# Patient Record
Sex: FEMALE | Race: WHITE | NOT HISPANIC OR LATINO | URBAN - METROPOLITAN AREA
[De-identification: names, ages, dates, MRNs, and addresses within clinical notes are randomized per-mention and may not be internally consistent; named-entity substitution may affect disease eponyms.]

---

## 2017-10-13 RX ORDER — HYDROCHLOROTHIAZIDE 12.5 MG/1
CAPSULE, GELATIN COATED ORAL
Qty: 90 CAPSULE | Refills: 0 | OUTPATIENT
Start: 2017-10-13

## 2017-10-13 NOTE — TELEPHONE ENCOUNTER
Called pt to inform appointment needed. Pt states she will call back to schedule appointment. Thanks, Elie

## 2022-05-03 ENCOUNTER — APPOINTMENT (RX ONLY)
Dept: URBAN - METROPOLITAN AREA CLINIC 123 | Facility: CLINIC | Age: 72
Setting detail: DERMATOLOGY
End: 2022-05-03

## 2022-05-03 DIAGNOSIS — L81.4 OTHER MELANIN HYPERPIGMENTATION: ICD-10-CM

## 2022-05-03 DIAGNOSIS — L82.1 OTHER SEBORRHEIC KERATOSIS: ICD-10-CM

## 2022-05-03 DIAGNOSIS — D22 MELANOCYTIC NEVI: ICD-10-CM

## 2022-05-03 DIAGNOSIS — Z71.89 OTHER SPECIFIED COUNSELING: ICD-10-CM

## 2022-05-03 DIAGNOSIS — L82.0 INFLAMED SEBORRHEIC KERATOSIS: ICD-10-CM

## 2022-05-03 PROBLEM — D48.5 NEOPLASM OF UNCERTAIN BEHAVIOR OF SKIN: Status: ACTIVE | Noted: 2022-05-03

## 2022-05-03 PROCEDURE — ? SHAVE REMOVAL

## 2022-05-03 PROCEDURE — ? COUNSELING

## 2022-05-03 PROCEDURE — 99203 OFFICE O/P NEW LOW 30 MIN: CPT | Mod: 25

## 2022-05-03 PROCEDURE — 11301 SHAVE SKIN LESION 0.6-1.0 CM: CPT

## 2022-05-03 PROCEDURE — 11301 SHAVE SKIN LESION 0.6-1.0 CM: CPT | Mod: 76

## 2022-05-03 PROCEDURE — 11300 SHAVE SKIN LESION 0.5 CM/<: CPT

## 2022-05-03 ASSESSMENT — LOCATION DETAILED DESCRIPTION DERM
LOCATION DETAILED: LEFT LATERAL SHOULDER
LOCATION DETAILED: RIGHT PROXIMAL RADIAL DORSAL FOREARM
LOCATION DETAILED: LEFT LATERAL SUPERIOR CHEST
LOCATION DETAILED: LEFT ANTERIOR DISTAL THIGH
LOCATION DETAILED: MIDDLE STERNUM
LOCATION DETAILED: LEFT INFERIOR CENTRAL MALAR CHEEK
LOCATION DETAILED: LOWER STERNUM

## 2022-05-03 ASSESSMENT — LOCATION ZONE DERM
LOCATION ZONE: ARM
LOCATION ZONE: LEG
LOCATION ZONE: FACE
LOCATION ZONE: TRUNK

## 2022-05-03 ASSESSMENT — LOCATION SIMPLE DESCRIPTION DERM
LOCATION SIMPLE: LEFT THIGH
LOCATION SIMPLE: CHEST
LOCATION SIMPLE: LEFT CHEEK
LOCATION SIMPLE: LEFT SHOULDER
LOCATION SIMPLE: RIGHT FOREARM

## 2022-05-03 NOTE — PROCEDURE: SHAVE REMOVAL
Medical Necessity Information: It is in your best interest to select a reason for this procedure from the list below. All of these items fulfill various CMS LCD requirements except the new and changing color options.
Medical Necessity Clause: This procedure was medically necessary because the lesion that was treated was:
Lab: [facility]
Lab Facility: [address]
Body Location Override (Optional - Billing Will Still Be Based On Selected Body Map Location If Applicable): left superior shoulder
Detail Level: Detailed
Was A Bandage Applied: Yes
Size Of Lesion In Cm (Required): 0.7
Size Of Margin In Cm (Margins Are Not Added To Billing Dimensions): 0.2
Biopsy Method: Dermablade
Anesthesia Type: 1% lidocaine with epinephrine and a 1:10 solution of 8.4% sodium bicarbonate
Hemostasis: Drysol
Wound Care: Petrolatum
Path Notes (To The Dermatopathologist): E
Render Path Notes In Note?: No
Triangulation (Location Of Lesion In Relation To Distance From Anatomical Landmarks): Kevin
Consent was obtained from the patient. The risks and benefits to therapy were discussed in detail. Specifically, the risks of infection, scarring, bleeding, prolonged wound healing, incomplete removal, allergy to anesthesia, nerve injury and recurrence were addressed. Prior to the procedure, the treatment site was clearly identified and confirmed by the patient. All components of Universal Protocol/PAUSE Rule completed.
Post-Care Instructions: I reviewed with the patient in detail post-care instructions. Patient is to keep the biopsy site dry overnight, and then apply bacitracin twice daily until healed. Patient may apply hydrogen peroxide soaks to remove any crusting.
Notification Instructions: Patient will be notified of pathology results. However, patient instructed to call the office if not contacted within 2 weeks.
Billing Type: [employer]
Lab: [id number]
Lab Facility: [id number]
Body Location Override (Optional - Billing Will Still Be Based On Selected Body Map Location If Applicable): left distal thigh
X Size Of Lesion In Cm (Optional): 0.6
Billing Type: Third-Party Bill
Body Location Override (Optional - Billing Will Still Be Based On Selected Body Map Location If Applicable): left clavicle
Size Of Lesion In Cm (Required): 0.4

## 2022-09-19 ENCOUNTER — APPOINTMENT (RX ONLY)
Dept: URBAN - METROPOLITAN AREA CLINIC 123 | Facility: CLINIC | Age: 72
Setting detail: DERMATOLOGY
End: 2022-09-19

## 2022-09-19 DIAGNOSIS — L57.0 ACTINIC KERATOSIS: ICD-10-CM

## 2022-09-19 DIAGNOSIS — L81.4 OTHER MELANIN HYPERPIGMENTATION: ICD-10-CM

## 2022-09-19 DIAGNOSIS — D22 MELANOCYTIC NEVI: ICD-10-CM

## 2022-09-19 DIAGNOSIS — D18.0 HEMANGIOMA: ICD-10-CM

## 2022-09-19 DIAGNOSIS — Z71.89 OTHER SPECIFIED COUNSELING: ICD-10-CM

## 2022-09-19 DIAGNOSIS — L82.0 INFLAMED SEBORRHEIC KERATOSIS: ICD-10-CM

## 2022-09-19 DIAGNOSIS — L72.0 EPIDERMAL CYST: ICD-10-CM

## 2022-09-19 DIAGNOSIS — R20.2 PARESTHESIA OF SKIN: ICD-10-CM

## 2022-09-19 DIAGNOSIS — L82.1 OTHER SEBORRHEIC KERATOSIS: ICD-10-CM

## 2022-09-19 PROBLEM — D18.01 HEMANGIOMA OF SKIN AND SUBCUTANEOUS TISSUE: Status: ACTIVE | Noted: 2022-09-19

## 2022-09-19 PROBLEM — D22.72 MELANOCYTIC NEVI OF LEFT LOWER LIMB, INCLUDING HIP: Status: ACTIVE | Noted: 2022-09-19

## 2022-09-19 PROCEDURE — ? PRESCRIPTION MEDICATION MANAGEMENT

## 2022-09-19 PROCEDURE — ? TREATMENT REGIMEN

## 2022-09-19 PROCEDURE — 17000 DESTRUCT PREMALG LESION: CPT | Mod: 59

## 2022-09-19 PROCEDURE — ? COUNSELING

## 2022-09-19 PROCEDURE — 17110 DESTRUCTION B9 LES UP TO 14: CPT

## 2022-09-19 PROCEDURE — 17003 DESTRUCT PREMALG LES 2-14: CPT | Mod: 59

## 2022-09-19 PROCEDURE — 99214 OFFICE O/P EST MOD 30 MIN: CPT | Mod: 25

## 2022-09-19 PROCEDURE — ? LIQUID NITROGEN

## 2022-09-19 ASSESSMENT — LOCATION SIMPLE DESCRIPTION DERM
LOCATION SIMPLE: CHEST
LOCATION SIMPLE: ABDOMEN
LOCATION SIMPLE: LEFT THIGH
LOCATION SIMPLE: LEFT CHEEK
LOCATION SIMPLE: RIGHT POSTERIOR UPPER ARM
LOCATION SIMPLE: RIGHT UPPER BACK
LOCATION SIMPLE: LEFT INFERIOR EYELID
LOCATION SIMPLE: LEFT UPPER BACK
LOCATION SIMPLE: RIGHT CALF

## 2022-09-19 ASSESSMENT — LOCATION DETAILED DESCRIPTION DERM
LOCATION DETAILED: LEFT RIB CAGE
LOCATION DETAILED: RIGHT MID-UPPER BACK
LOCATION DETAILED: EPIGASTRIC SKIN
LOCATION DETAILED: RIGHT DISTAL POSTERIOR UPPER ARM
LOCATION DETAILED: RIGHT RIB CAGE
LOCATION DETAILED: LEFT MEDIAL SUPERIOR CHEST
LOCATION DETAILED: RIGHT DISTAL CALF
LOCATION DETAILED: LEFT MEDIAL INFERIOR EYELID
LOCATION DETAILED: PERIUMBILICAL SKIN
LOCATION DETAILED: LEFT ANTERIOR DISTAL THIGH
LOCATION DETAILED: LEFT MEDIAL MALAR CHEEK
LOCATION DETAILED: LEFT SUPERIOR UPPER BACK
LOCATION DETAILED: LEFT INFERIOR MEDIAL MALAR CHEEK

## 2022-09-19 ASSESSMENT — LOCATION ZONE DERM
LOCATION ZONE: LEG
LOCATION ZONE: EYELID
LOCATION ZONE: FACE
LOCATION ZONE: ARM
LOCATION ZONE: TRUNK

## 2022-09-19 NOTE — PROCEDURE: TREATMENT REGIMEN
Detail Level: Simple
Plan: Recommended consult with ophthalmologist to confirm.  Recommended Dr. Gavin Faulkner and Dr. Graciela Onofre
Plan: Discussed treatment options including lasers.  Give cosmetic coordinatorâs card
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
Otc Regimen: Anti itch creams

## 2022-09-19 NOTE — HPI: ITCHING
What Type Of Note Output Would You Prefer (Optional)?: Bullet Format
How Did Your Itching Occur?: sudden in onset (over a period of weeks to a few months)
How Severe Is Your Itching?: mild
On A Scale Of 0 To 10 How Would You Rate Your Itching?: 3

## 2022-09-19 NOTE — PROCEDURE: LIQUID NITROGEN
Show Applicator Variable?: Yes
Duration Of Freeze Thaw-Cycle (Seconds): 5
Post-Care Instructions: I reviewed with the patient in detail post-care instructions. Patient is to wear sunprotection, and avoid picking at any of the treated lesions. Pt may apply Vaseline to crusted or scabbing areas.
Render Note In Bullet Format When Appropriate: No
Consent: The patient's consent was obtained including but not limited to risks of crusting, scabbing, blistering, scarring, darker or lighter pigmentary change, recurrence, incomplete removal and infection.
Detail Level: Simple
Number Of Freeze-Thaw Cycles: 1 freeze-thaw cycle
Medical Necessity Information: It is in your best interest to select a reason for this procedure from the list below. All of these items fulfill various CMS LCD requirements except the new and changing color options.
Medical Necessity Clause: This procedure was medically necessary because the lesions that were treated were:
Spray Paint Text: The liquid nitrogen was applied to the skin utilizing a spray paint frosting technique.

## 2023-03-10 ENCOUNTER — RX ONLY (OUTPATIENT)
Age: 73
Setting detail: RX ONLY
End: 2023-03-10

## 2023-03-10 RX ORDER — OXYMETAZOLINE HYDROCHLORIDE OPHTHALMIC 1 MG/ML
1 SOLUTION/ DROPS OPHTHALMIC QD
Qty: 1 | Refills: 0 | COMMUNITY
Start: 2023-03-10

## 2023-04-13 ENCOUNTER — APPOINTMENT (RX ONLY)
Dept: URBAN - METROPOLITAN AREA CLINIC 126 | Facility: CLINIC | Age: 73
Setting detail: DERMATOLOGY
End: 2023-04-13

## 2023-04-13 DIAGNOSIS — Z41.9 ENCOUNTER FOR PROCEDURE FOR PURPOSES OTHER THAN REMEDYING HEALTH STATE, UNSPECIFIED: ICD-10-CM

## 2023-04-13 PROCEDURE — ? DIAGNOSIS COMMENT

## 2023-04-13 PROCEDURE — ? SCITON BBL HERO

## 2023-04-13 PROCEDURE — ? SCITON MOXI

## 2023-04-13 ASSESSMENT — LOCATION DETAILED DESCRIPTION DERM
LOCATION DETAILED: RIGHT MEDIAL MALAR CHEEK
LOCATION DETAILED: LEFT INFERIOR CENTRAL MALAR CHEEK

## 2023-04-13 ASSESSMENT — LOCATION ZONE DERM: LOCATION ZONE: FACE

## 2023-04-13 ASSESSMENT — LOCATION SIMPLE DESCRIPTION DERM
LOCATION SIMPLE: LEFT CHEEK
LOCATION SIMPLE: RIGHT CHEEK

## 2023-04-13 NOTE — PROCEDURE: SCITON BBL HERO
Add Setting 2?: yes
Spot Size: Finesse Adapter Size: 7 mm round
Pulse Width Units: milliseconds
Total Pulses (Optional): [address] [place]
Spot Size: Finesse Adapter Size: 15 x 15 mm square
Total Pulses (Optional): [address]
Pulse Width - Will Not Render If 0: [address] [address]
Fluence (J/Cm2) - Will Not Render If 0: 15
Pulse Width - Will Not Render If 0: [address] [facility]
Temp (C): 25
Filter: 560nm Filter
Fluence (J/Cm2) - Will Not Render If 0: 0
Fluence (J/Cm2) - Will Not Render If 0: 20
Filter: 515nm Filter
Treatment Number: 1
Total Pulses (Optional): 10
Price (Use Numbers Only, No Special Characters Or $): [address] [facility]
Total Pulses (Optional): [address] [facility]
Total Pulses (Optional): [address] [facility]
Spot Size: Finesse Adapter Size: 15 x 45 mm (No Finesse Adapter)
Consent: Written consent obtained. Risks reviewed including but not limited to crusting, scabbing, blistering, scarring, darker or lighter pigmentary change, and incomplete clearance.
Add Setting 6?: no
Detail Level: Zone
Procedure Note: After applying gel and applying protective eyeware, treatment was administered using the setting parameters listed above.
Fluence (J/Cm2) - Will Not Render If 0: 6
Pulse Width - Will Not Render If 0: 3
Fluence (J/Cm2) - Will Not Render If 0: 21
Anesthesia Type: 1% lidocaine with epinephrine
Post-Care Instructions: I reviewed with the patient in detail post-care instructions. Patient should avoid sun exposure before and after treatment. Patient should wear sunscreen.

## 2023-04-13 NOTE — PROCEDURE: SCITON MOXI
Consent: Written consent obtained. Risks were reviewed including but not limited to crusting, scabbing, blistering, scarring, darker or lighter pigmentary change, incomplete improvement, prolonged erythema and facial swelling, infection, and bleeding.
Number Of Passes: 6
Treatment Number: 1
Energy (J/Cm2): 15
Anesthesia Type: 1% lidocaine with epinephrine
Pre=procedure Text: After consent was obtained, the treatment areas were cleaned and treated using the parameters noted above.
Post-Care Instructions: I reviewed with the patient in detail post-care instructions. Recommended diligent sun protection and sun avoidance.
Level 3
Laser Type: Fractionated 1927nm, thulium laser
Total Accum[facility]): [facility]
External Cooling Fan Speed: 5
Detail Level: Zone

## 2023-05-12 ENCOUNTER — APPOINTMENT (RX ONLY)
Dept: URBAN - METROPOLITAN AREA CLINIC 125 | Facility: CLINIC | Age: 73
Setting detail: DERMATOLOGY
End: 2023-05-12

## 2023-05-12 DIAGNOSIS — Z41.9 ENCOUNTER FOR PROCEDURE FOR PURPOSES OTHER THAN REMEDYING HEALTH STATE, UNSPECIFIED: ICD-10-CM

## 2023-05-12 PROCEDURE — ? COSMETIC CONSULTATION: FILLERS

## 2023-05-12 PROCEDURE — ? COSMETIC CONSULTATION: BOTOX

## 2023-05-12 PROCEDURE — ? DIAGNOSIS COMMENT

## 2023-05-12 NOTE — PROCEDURE: DIAGNOSIS COMMENT
Render Risk Assessment In Note?: no
Comment: Did not want photos at time of consultation. Discussed doing filler after series of laser treatments are complete. OK to honor filler promo until end of June per Fadi Chavez.
Detail Level: Simple
Other Specify

## 2023-05-16 ENCOUNTER — APPOINTMENT (RX ONLY)
Dept: URBAN - METROPOLITAN AREA CLINIC 126 | Facility: CLINIC | Age: 73
Setting detail: DERMATOLOGY
End: 2023-05-16

## 2023-05-16 DIAGNOSIS — Z41.9 ENCOUNTER FOR PROCEDURE FOR PURPOSES OTHER THAN REMEDYING HEALTH STATE, UNSPECIFIED: ICD-10-CM

## 2023-05-16 PROCEDURE — ? SCITON MOXI

## 2023-05-16 PROCEDURE — ? DIAGNOSIS COMMENT

## 2023-05-16 PROCEDURE — ? SCITON BBL HERO

## 2023-05-16 ASSESSMENT — LOCATION DETAILED DESCRIPTION DERM: LOCATION DETAILED: LEFT CENTRAL MALAR CHEEK

## 2023-05-16 ASSESSMENT — LOCATION SIMPLE DESCRIPTION DERM: LOCATION SIMPLE: LEFT CHEEK

## 2023-05-16 ASSESSMENT — LOCATION ZONE DERM: LOCATION ZONE: FACE

## 2023-05-16 NOTE — PROCEDURE: DIAGNOSIS COMMENT
Render Risk Assessment In Note?: yes
Detail Level: Detailed
Comment: Treatment #2 of 3, treated SK Right [address]

## 2023-05-16 NOTE — PROCEDURE: SCITON BBL HERO
Spot Size: Finesse Adapter Size: 15 x 15 mm square
Fluence (J/Cm2) - Will Not Render If 0: [address] [facility]
Add Setting 3?: yes
Consent: Written consent obtained. Risks reviewed including but not limited to crusting, scabbing, blistering, scarring, darker or lighter pigmentary change, and incomplete clearance.
Pulse Width Units: milliseconds
Temp (C): [address] [facility]
Procedure Note: After applying gel and applying protective eyeware, treatment was administered using the setting parameters listed above.
Pulse Width - Will Not Render If 0: 20
Spot Size: Finesse Adapter Size: 15 x 45 mm (No Finesse Adapter)
Add Setting 6?: no
Filter: 515nm Filter
Fluence (J/Cm2) - Will Not Render If 0: 6
Post-Care Instructions: I reviewed with the patient in detail post-care instructions. Patient should avoid sun exposure before and after treatment. Patient should wear sunscreen.
Anesthesia Type: 1% lidocaine with epinephrine
Total Pulses (Optional): [place]
Pulse Width - Will Not Render If 0: 0
Pulse Width - Will Not Render If 0: 3
Spot Size: Finesse Adapter Size: 7 mm round
Fluence (J/Cm2) - Will Not Render If 0: 12
Pulse Width - Will Not Render If 0: 21
Detail Level: Zone
Total Pulses (Optional): [address]
Total Pulses (Optional): [address]
Filter: 560nm Filter
Temp (C): 25
Price (Use Numbers Only, No Special Characters Or $): Wil Roberts
Fluence (J/Cm2) - Will Not Render If 0: 15
Filter: 640nm Filter
Total Pulses (Optional): 22

## 2023-05-16 NOTE — PROCEDURE: SCITON MOXI
Consent: Written consent obtained. Risks were reviewed including but not limited to crusting, scabbing, blistering, scarring, darker or lighter pigmentary change, incomplete improvement, prolonged erythema and facial swelling, infection, and bleeding.
Treatment Number: 1
% Per Pass: 2.9
Price (Use Numbers Only, No Special Characters Or $): Wil Roberts
Level 3
Energy (J/Cm2): [address] [facility]
Number Of Passes: 7
Anesthesia Type: 1% lidocaine with epinephrine
Total Accumulated Energy (J): [address]
Pre=procedure Text: After consent was obtained, the treatment areas were cleaned and treated using the parameters noted above.
Detail Level: Detailed
Post-Care Instructions: I reviewed with the patient in detail post-care instructions. Recommended diligent sun protection and sun avoidance.
External Cooling Fan Speed: 5
Laser Type: Fractionated 1927nm, thulium laser
Total Coverage (%): 20

## 2023-05-31 ENCOUNTER — APPOINTMENT (RX ONLY)
Dept: URBAN - METROPOLITAN AREA CLINIC 126 | Facility: CLINIC | Age: 73
Setting detail: DERMATOLOGY
End: 2023-05-31

## 2023-05-31 DIAGNOSIS — Z41.9 ENCOUNTER FOR PROCEDURE FOR PURPOSES OTHER THAN REMEDYING HEALTH STATE, UNSPECIFIED: ICD-10-CM

## 2023-05-31 PROCEDURE — ? DAXXIFY

## 2023-05-31 PROCEDURE — ? INVENTORY

## 2023-05-31 NOTE — PROCEDURE: DAXXIFY
Show Additional Area 4: Yes
Show Right And Left Pupillary Line Units: No
Left Periorbital Units: 0
Post-Care Instructions: Patient instructed to not lie down for 4 hours and limit physical activity for 24 hours.
Show Inventory Tab: Show
Additional Area 3 Location: upper lip lines
Glabellar Complex Units: [address]
Additional Area 1 Location: brow lift
Detail Level: Zone
Additional Area 3 Units: 4
Additional Area 2 Location: bunnies
Consent: Written consent obtained. Risks include but not limited to lid/brow ptosis, bruising, swelling, diplopia, temporary effect, incomplete chemical denervation.

## 2023-06-19 ENCOUNTER — APPOINTMENT (RX ONLY)
Dept: URBAN - METROPOLITAN AREA CLINIC 126 | Facility: CLINIC | Age: 73
Setting detail: DERMATOLOGY
End: 2023-06-19

## 2023-06-19 DIAGNOSIS — Z41.9 ENCOUNTER FOR PROCEDURE FOR PURPOSES OTHER THAN REMEDYING HEALTH STATE, UNSPECIFIED: ICD-10-CM

## 2023-06-19 PROCEDURE — ? COSMETIC FOLLOW-UP

## 2023-06-19 PROCEDURE — ? DAXXIFY

## 2023-06-19 PROCEDURE — ? INVENTORY

## 2023-06-19 PROCEDURE — ? PHOTO-DOCUMENTATION

## 2023-06-19 NOTE — PROCEDURE: COSMETIC FOLLOW-UP
Patient Satisfaction: Very pleased
Treatment Override (Free Text): Daxxify
Side Effects Or Complications: None
Global Improvement: Very Good
Price (Use Numbers Only, No Special Characters Or $): 0
Detail Level: Zone

## 2023-06-19 NOTE — PROCEDURE: DAXXIFY
Show Right And Left Periorbital Units: No
Forehead Units: 0
Show Depressor Anguli Units: Yes
Additional Area 1 Location: brow lift
Show Inventory Tab: Show
Additional Area 3 Units: 2
Additional Area 4 Location: oral commissars
Additional Area 2 Location: bunnies
Post-Care Instructions: Patient instructed to not lie down for 4 hours and limit physical activity for 24 hours.
Detail Level: Zone
Consent: Written consent obtained. Risks include but not limited to lid/brow ptosis, bruising, swelling, diplopia, temporary effect, incomplete chemical denervation.
Additional Area 3 Location: upper lip lines
Dilution (U/0.1 Cc): 4

## 2023-06-29 ENCOUNTER — APPOINTMENT (RX ONLY)
Dept: URBAN - METROPOLITAN AREA CLINIC 126 | Facility: CLINIC | Age: 73
Setting detail: DERMATOLOGY
End: 2023-06-29

## 2023-06-29 DIAGNOSIS — Z41.9 ENCOUNTER FOR PROCEDURE FOR PURPOSES OTHER THAN REMEDYING HEALTH STATE, UNSPECIFIED: ICD-10-CM

## 2023-06-29 PROCEDURE — ? SCITON MOXI

## 2023-06-29 PROCEDURE — ? DIAGNOSIS COMMENT

## 2023-06-29 PROCEDURE — ? INVENTORY

## 2023-06-29 PROCEDURE — ? SCITON BBL HERO

## 2023-06-29 ASSESSMENT — LOCATION ZONE DERM: LOCATION ZONE: FACE

## 2023-06-29 ASSESSMENT — LOCATION SIMPLE DESCRIPTION DERM: LOCATION SIMPLE: RIGHT CHEEK

## 2023-06-29 ASSESSMENT — LOCATION DETAILED DESCRIPTION DERM: LOCATION DETAILED: RIGHT INFERIOR CENTRAL MALAR CHEEK

## 2023-06-29 NOTE — PROCEDURE: DIAGNOSIS COMMENT
Render Risk Assessment In Note?: yes
Detail Level: Detailed
Comment: Dotty treated B/L cheeks withLN2

## 2023-06-29 NOTE — PROCEDURE: SCITON BBL HERO
Fluence (J/Cm2) - Will Not Render If 0: [address] [facility]
Pulse Width Units: milliseconds
Temp (C): 20
External Cooling Fan Speed: 0
Filter: 560nm Filter
Spot Size: Finesse Adapter Size: 15 x 15 mm square
Add Setting 5?: no
Consent: Written consent obtained. Risks reviewed including but not limited to crusting, scabbing, blistering, scarring, darker or lighter pigmentary change, and incomplete clearance.
Total Pulses (Optional): [address]
Procedure Note: After applying gel and applying protective eyeware, treatment was administered using the setting parameters listed above.
Total Pulses (Optional): 25
Add Setting 2?: yes
Spot Size: Finesse Adapter Size: 7 mm round
Detail Level: Simple
Fluence (J/Cm2) - Will Not Render If 0: 15
Anesthesia Type: 1% lidocaine with epinephrine
Post-Care Instructions: I reviewed with the patient in detail post-care instructions. Patient should avoid sun exposure before and after treatment. Patient should wear sunscreen.
Fluence (J/Cm2) - Will Not Render If 0: [address] [facility]
Total Pulses (Optional): [address]
Filter: 515nm Filter

## 2023-06-29 NOTE — PROCEDURE: SCITON MOXI
% Per Pass: 3.3
Number Of Passes: 6
Energy (J/Cm2): [address] [facility]
Consent: Written consent obtained. Risks were reviewed including but not limited to crusting, scabbing, blistering, scarring, darker or lighter pigmentary change, incomplete improvement, prolonged erythema and facial swelling, infection, and bleeding.
Laser Type: Fractionated 1927nm, thulium laser
External Cooling Fan Speed: 5
Detail Level: Detailed
Total Accumulated Energy (J): [address]
Total Coverage (%): 20
Post-Care Instructions: I reviewed with the patient in detail post-care instructions. Recommended diligent sun protection and sun avoidance.
Anesthesia Type: 1% lidocaine with epinephrine
Pre=procedure Text: After consent was obtained, the treatment areas were cleaned and treated using the parameters noted above.
Treatment Number: 1
Level 3

## 2023-10-03 ENCOUNTER — TRANSCRIBE ORDERS (OUTPATIENT)
Dept: PHYSICAL THERAPY | Facility: HOSPITAL | Age: 73
End: 2023-10-03
Payer: MEDICARE

## 2023-10-03 DIAGNOSIS — I89.0 LYMPHEDEMA: Primary | ICD-10-CM

## 2023-10-06 ENCOUNTER — HOSPITAL ENCOUNTER (OUTPATIENT)
Dept: OCCUPATIONAL THERAPY | Facility: HOSPITAL | Age: 73
Setting detail: THERAPIES SERIES
Discharge: HOME | End: 2023-10-06
Payer: MEDICARE

## 2023-10-06 DIAGNOSIS — I89.0 LYMPHEDEMA, NOT ELSEWHERE CLASSIFIED: Primary | ICD-10-CM

## 2023-10-06 DIAGNOSIS — I89.0 LYMPHEDEMA OF BOTH LOWER EXTREMITIES: ICD-10-CM

## 2023-10-06 PROCEDURE — 97165 OT EVAL LOW COMPLEX 30 MIN: CPT

## 2023-10-06 NOTE — THERAPY EVALUATION
"Outpatient Occupational Therapy Lymphedema Initial Evaluation  [facility]     Patient Name: Abhinav Fields  : 1950  MRN: [id number]  Today's Date: 10/6/2023      Visit Date: 10/06/2023    Patient Active Problem List   Diagnosis    Blues    HLD (hyperlipidemia)    BP (high blood pressure)    Osteopenia    Borderline diabetes        Past Medical History:   Diagnosis Date    Acute upper respiratory infection     Depression     Encounter for routine gynecological examination with Papanicolaou smear of cervix     Former smoker     H/O bone density study     H/O echocardiogram 2012    H/O mammogram     Health care maintenance     History of EKG 2013    Hyperlipidemia     Hypertension     Osteopenia     Physical exam, routine     Postmenopausal status     Prediabetes     Squamous cell carcinoma of skin         Past Surgical History:   Procedure Laterality Date    FOOT SURGERY Left          Visit Dx:     ICD-10-CM ICD-9-CM   1. Lymphedema, not elsewhere classified  I89.0 457.1   2. Lymphedema of both lower extremities  I89.0 457.1        Patient History       Row Name 10/06/23 1000             History    Chief Complaint Swelling  -CW      Date Current Problem(s) Began --  approx 7 years ago  -CW      Brief Description of Current Complaint Pt. reports her LE edema started about 7 years ago and has gotten worse over time. Hx. HTN, chronic venous insufficiency, HLD, LE swelling. She has tried stockings, diuretics. No CHF, but recently pt. c/o mild SOB. Hx. \"burned\" vein procedure BLE's and hysterectomy.  -CW      Patient/Caregiver Goals Know what to do to help the symptoms;Decrease swelling  -CW      Occupation/sports/leisure activities tennis, pilates, walking  -CW      How has patient tried to help current problem? Stockings, meds  -CW         Fall Risk Assessment    Any falls in the past year: Unspecified  -CW         Services    Are you currently receiving Home Health services No  -CW         Daily " "Activities    Primary Language English  -CW      Are you able to read Yes  -CW      Are you able to write Yes  -CW      Teaching needs identified Management of Condition;Home Exercise Program  -CW      Patient is concerned about/has problems with Climbing Stairs  -CW      Barriers to learning None  -CW      Explanation of Functional Status Problem Pt. reports she is indep. with self care and is active. Plays tennis and walks. Some difficulty with  steps noting mild SOB. Does pilates.  -CW      Pt Participated in POC and Goals Yes  -CW         Safety    Are you being hurt, hit, or frightened by anyone at home or in your life? No  -CW      Are you being neglected by a caregiver No  -CW                User Key  (r) = Recorded By, (t) = Taken By, (c) = Cosigned By      Initials Name Provider Type    Latanya Juan OTR Occupational Therapist                     Lymphedema       Row Name 10/06/23 1100             Subjective Pain    Able to rate subjective pain? yes  -CW      Pre-Treatment Pain Level 0  -CW      Post-Treatment Pain Level 0  -CW         Subjective    Subjective Comments Some tenderness/aching at times B lower legs.  -CW         Lymphedema Assessment    Lymphedema Classification RLE:;LLE:;stage 1 (Spontaneously Reversible)  -CW      Infections or Cellulitis? no  -CW         LLIS - Physical Concerns    The amount of pain associated with my lymphedema is: 1  -CW      The amount of limb heaviness associated with my lymphedema is: 3  -CW      The amount of skin tightness associated with my lymphedema is: 1  -CW      The size of my swollen limb(s) seems: 2  -CW      Lymphedema affects the movement of my swollen limb(s): 0  -CW      The strength in my swollen limb(s) is: 1  -CW         LLIS - Psychosocial Concerns    Lymphedema affects my body image (i.e., \"how I think I look\"). 4  -CW      Lymphedema affects my socializing with others. 2  -CW      Lymphedema affects my intimate relations with spouse or partner " "(rate 0 if not applicable 0  -CW      Lymphedema \"gets me down\" (i.e., depression, frustration, or anger) 2  -CW      I must rely on others for help due to my lymphedema. 0  -CW      I know what to do to manage my lymphedema 0  -CW         LLIS - Functional Concerns    Lymphedema affects my ability to perform self-care activities (i.e. eating, dressing, hygiene) 0  -CW      Lymphedema affects my ability to perform routine home or work-related activities. 0  -CW      Lymphedema affects my performance of preferred leisure activities. --  answered \"maybe\"  -CW      Lymphedema affects proper fit of clothing/shoes 2  -CW      Lymphedema affects my sleep 0  -CW         General ROM    GENERAL ROM COMMENTS BLE AROM WFL's.  -CW         MMT (Manual Muscle Testing)    General MMT Comments 4+/5 BLE's  -CW         Lymphedema Edema Assessment    Ptting Edema Category By grade out of 4  -CW      Pitting Edema Moderate;+ 2/4  -CW      Stemmer Sign bilateral:;negative  -CW         Skin Changes/Observations    Location/Assessment Lower Extremity  -CW      Lower Extremity Conditions bilateral:;normal;intact  -CW         Lymphedema Sensation    Lymphedema Sensation Reports RLE:;LLE:;normal  -CW         Lymphedema Measurements    Measurement Type(s) Circumferential  -CW      Circumferential Areas Lower extremities  -CW         BLE Circumferential (cm)    Measurement Location 1 10 cm. above knee  -CW      Left 1 46 cm  -CW      Right 1 45.7 cm  -CW      Measurement Location 2 knee  -CW      Left 2 37.2 cm  -CW      Right 2 37.3 cm  -CW      Measurement Location 3 10 cm. below knee  -CW      Left 3 43.2 cm  -CW      Right 3 43 cm  -CW      Measurement Location 4 20 cm. below knee  -CW      Left 4 32.8 cm  -CW      Right 4 33.7 cm  -CW      Measurement Location 5 30 cm. below knee  -CW      Left 5 25.5 cm  -CW      Right 5 25 cm  -CW      Measurement Location 6 ankle  -CW      Left 6 27.7 cm  -CW      Right 6 26.7 cm  -CW      Measurement " Location 7 mid foot  -CW      Left 7 21.5 cm  -CW      Right 7 21.5 cm  -CW      LLE Circumferential Total 233.9 cm  -CW      RLE Circumferential Total 232.9 cm  -CW         Compression/Skin Care    Compression/Skin Care compression garment  -CW      Compression Garment Comments Pt. has compression stockings at home, but may be too tight.  -CW         Lymphedema Life Impact Scale Totals    A.  Total Q1 - Q17 (Do not include Q18) 18  -CW      B.  Total number of questions answered (Q1-Q17) 16  -CW      C. Divide A by B 1.13  -CW      D. Multiple C by 25 28.25  -CW                User Key  (r) = Recorded By, (t) = Taken By, (c) = Cosigned By      Initials Name Provider Type    Latanya Juan OTR Occupational Therapist                            Therapy Education  Education Details: Discussed lymph. tx. program and poc. Issued handouts including healthy habits for edema risk reduction.  Given: Edema management, Symptoms/condition management  Program: New  How Provided: Verbal  Provided to: Patient  Level of Understanding: Verbalized         OT Goals       Row Name 10/06/23 1100          OT Short Term Goals    STG Date to Achieve 10/20/23  -CW     STG 1 Patient to demonstrate proper awareness of “What is Lymphedema” and DO’s and Don’ts” for improved prevention, management, care of symptoms, and ease of transition to self-care of condition.  -CW     STG 1 Progress New  -CW     STG 2 Patient independent and compliant with self-wrapping techniques of compression bandages with family member or caregiver as indicated for improved self-management of condition.  -CW     STG 2 Progress New  -     STG 3 Patient demonstrate decreased net edema of  BLE's  >/5-10 cm. for decreased in edema, symptoms, decreased risk of infection, and improved skin-care/transition to self-care of condition.  -CW     STG 3 Progress New  -        Long Term Goals    LTG Date to Achieve 11/03/23  -CW     LTG 1 Patient independent and compliant  with home exercise program (as able) focused on range of motion, flexibility, to improve lymphatic flow and discomfort.  -CW     LTG 1 Progress New  -CW     LTG 2 Patient will demonstrate decreased net edema of  BLE's >/=10-20 cm. for decrease in symptoms, decreased risk of infection, and improved skin-care/transition to self-care of condition.  -CW     LTG 2 Progress New  -CW     LTG 3 Patient independent and compliant with use and care of compression (example garments, inelastic velcro compression) with assistance of a caregiver as needed to promote self-care independence.  -CW     LTG 3 Progress New  -CW     LTG 4 Improve LLIS Lower extremity Life Impact scale score by 4 points.  -CW     LTG 4 Progress New  -CW        Time Calculation    OT Goal Re-Cert Due Date 12/29/23  -CW               User Key  (r) = Recorded By, (t) = Taken By, (c) = Cosigned By      Initials Name Provider Type    CW Latanya Augustine OTR Occupational Therapist                     OT Assessment/Plan       Row Name 10/06/23 1126 10/06/23 1124       OT Assessment    Functional Limitations -- Limitations in functional capacity and performance;Performance in leisure activities  -CW    Impairments -- Edema;Impaired venous circulation;Integumentary integrity;Impaired lymphatic circulation  -CW    Assessment Comments Pt. presents 72 y.o. female with moderate increased edema BLE’s. Edema is 2+ B feet to above knees. Pt. reports  her edema started about 7 years ago and has gotten worse over time with increased LE edema towards the end of the day. No LE pain complaints at rest. Total circumference .9 cm. and .9 cm. Skin is intact with no rashes or open wounds. Pt. is able to apply her compression stockings with some difficulty. Pt. would benefit from full Complete Decongestive Therapy (CDT) short term to decrease edema, decrease risk of infection, improve skin integrity, and to learn independent self-care edema management. Discussed long  term edema management with stockings vs.velcro compression and bandages. LLIS score 28.25.  -CW --    Please refer to paper survey for additional self-reported information -- Yes  -CW    OT Rehab Potential -- Good  -CW    Patient/caregiver participated in establishment of treatment plan and goals -- Yes  -CW    Patient would benefit from skilled therapy intervention -- Yes  -CW       OT Plan    OT Frequency -- 4x/week  -CW    Predicted Duration of Therapy Intervention (OT) -- 2-4 weeks  -CW    Planned CPT's? -- OT EVAL LOW COMPLEXITY: 62373;OT SELF CARE/MGMT/TRAIN 15 MIN: 29549;OT MANUAL THERAPY EA 15 MIN: 66813  -CW    Planned Therapy Interventions (Optional Details) -- home exercise program;manual therapy techniques;patient/family education  -CW    OT Plan Comments -- Plan to schedule pt. for tx.  -CW              User Key  (r) = Recorded By, (t) = Taken By, (c) = Cosigned By      Initials Name Provider Type    Latanya Juan OTR Occupational Therapist                              Time Calculation:   OT Start Time: 0830  OT Stop Time: 0930  OT Time Calculation (min): 60 min  Total Timed Code Minutes- OT: 60 minute(s)     Therapy Charges for Today       Code Description Service Date Service Provider Modifiers Qty    35872236311  OT EVAL LOW COMPLEXITY 4 10/6/2023 Latanya Augustine OTR GO 1                      RENEA Henry  10/6/2023

## 2023-10-26 ENCOUNTER — OFFICE VISIT (OUTPATIENT)
Dept: FAMILY MEDICINE CLINIC | Facility: CLINIC | Age: 73
End: 2023-10-26
Payer: MEDICARE

## 2023-10-26 VITALS
HEIGHT: 60 IN | RESPIRATION RATE: 16 BRPM | DIASTOLIC BLOOD PRESSURE: 86 MMHG | SYSTOLIC BLOOD PRESSURE: 126 MMHG | WEIGHT: 144 LBS | TEMPERATURE: 97.3 F | HEART RATE: 70 BPM | OXYGEN SATURATION: 97 % | BODY MASS INDEX: 28.27 KG/M2

## 2023-10-26 DIAGNOSIS — I89.0 LYMPHEDEMA: ICD-10-CM

## 2023-10-26 DIAGNOSIS — I10 PRIMARY HYPERTENSION: Primary | ICD-10-CM

## 2023-10-26 DIAGNOSIS — Z79.899 LONG-TERM USE OF HIGH-RISK MEDICATION: ICD-10-CM

## 2023-10-26 DIAGNOSIS — F90.0 ATTENTION DEFICIT HYPERACTIVITY DISORDER (ADHD), PREDOMINANTLY INATTENTIVE TYPE: ICD-10-CM

## 2023-10-26 DIAGNOSIS — Z23 NEEDS FLU SHOT: ICD-10-CM

## 2023-10-26 RX ORDER — DEXTROAMPHETAMINE SACCHARATE, AMPHETAMINE ASPARTATE, DEXTROAMPHETAMINE SULFATE AND AMPHETAMINE SULFATE 3.75; 3.75; 3.75; 3.75 MG/1; MG/1; MG/1; MG/1
15 TABLET ORAL AS NEEDED
COMMUNITY
Start: 2023-07-19 | End: 2023-10-26 | Stop reason: SDUPTHER

## 2023-10-26 RX ORDER — CHOLECALCIFEROL (VITAMIN D3) 125 MCG
5 CAPSULE ORAL
COMMUNITY

## 2023-10-26 RX ORDER — CETIRIZINE HYDROCHLORIDE 10 MG/1
10 TABLET ORAL DAILY
COMMUNITY

## 2023-10-26 RX ORDER — FUROSEMIDE 40 MG/1
40 TABLET ORAL AS NEEDED
COMMUNITY

## 2023-10-26 RX ORDER — FLUOROURACIL 50 MG/G
1 CREAM TOPICAL AS NEEDED
COMMUNITY

## 2023-10-26 RX ORDER — LOSARTAN POTASSIUM 25 MG/1
25 TABLET ORAL 2 TIMES DAILY
Qty: 180 TABLET | Refills: 1 | Status: SHIPPED | OUTPATIENT
Start: 2023-10-26

## 2023-10-26 RX ORDER — LOSARTAN POTASSIUM 25 MG/1
25 TABLET ORAL 2 TIMES DAILY
COMMUNITY
Start: 2023-07-26 | End: 2023-10-26 | Stop reason: SDUPTHER

## 2023-10-26 RX ORDER — CALCIUM CARBONATE 300MG(750)
TABLET,CHEWABLE ORAL
COMMUNITY

## 2023-10-26 RX ORDER — DEXTROAMPHETAMINE SACCHARATE, AMPHETAMINE ASPARTATE, DEXTROAMPHETAMINE SULFATE AND AMPHETAMINE SULFATE 3.75; 3.75; 3.75; 3.75 MG/1; MG/1; MG/1; MG/1
15 TABLET ORAL AS NEEDED
Qty: 30 TABLET | Refills: 0 | Status: SHIPPED | OUTPATIENT
Start: 2023-10-26

## 2023-10-26 NOTE — PROGRESS NOTES
Subjective     Abhinav Fields is a 72 y.o. female.     Chief Complaint   Patient presents with    Establish Care     Discuss vaccine's     Hypertension    ADHD       History of Present Illness     To establish care, discuss the followings ;    Moved from [place]    HTN with leg swelling;  well controlled with current Rx, no S/E reported.   Doing well on Losartan 25 mg , takes 2 pill /day   Takes lasix PRN  Eats HD   Walking daily     C/o Poor focusing, problem with time management , dx with ADHD many years ago at [facility] , she takes Adderall only if she needs to pay attention , ~ one x /month     Lymphedema for many years , wears compression socks     Chronic feet pain , saw Pod recently , referred her to Lymphedema clinic   Scoliosis , doing piloti     Had Cologaurd last year was neg   Had mammo recently       Labs and documentation from previous PCP / consulting physician has been reviewed          The following portions of the patient's history were reviewed and updated as appropriate: allergies, current medications, past family history, past medical history, past social history, past surgical history, and problem list.        Review of Systems   Musculoskeletal:  Positive for arthralgias.       Vitals:    10/26/23 1100   BP: 126/86   Pulse: 70   Resp: 16   Temp: 97.3 °F (36.3 °C)   SpO2: 97%           10/26/23  1100   Weight: 65.3 kg (144 lb)         Body mass index is 28.12 kg/m².      Current Outpatient Medications   Medication Sig Dispense Refill    amphetamine-dextroamphetamine (Adderall) 15 MG tablet Take 1 tablet by mouth As Needed (, ADHD sx). 30 tablet 0    fluorouracil (EFUDEX) 5 % cream Apply 1 application  topically to the appropriate area as directed As Needed.      furosemide (LASIX) 40 MG tablet Take 1 tablet by mouth As Needed.      losartan (COZAAR) 25 MG tablet Take 1 tablet by mouth 2 (Two) Times a Day. 180 tablet 1     No current facility-administered medications for this visit.                Objective    Physical Exam  Vitals and nursing note reviewed.   Constitutional:       General: She is not in acute distress.     Appearance: She is not ill-appearing, toxic-appearing or diaphoretic.   Cardiovascular:      Rate and Rhythm: Normal rate and regular rhythm.      Heart sounds: Normal heart sounds. No murmur heard.  Pulmonary:      Effort: Pulmonary effort is normal. No respiratory distress.      Breath sounds: Normal breath sounds. No stridor. No wheezing or rhonchi.   Musculoskeletal:      Right lower leg: Edema present.      Left lower leg: Edema present.   Skin:     General: Skin is warm.      Coloration: Skin is not pale.   Neurological:      Mental Status: She is alert and oriented to person, place, and time.   Psychiatric:         Mood and Affect: Mood normal.         Behavior: Behavior normal.         Thought Content: Thought content normal.           Assessment & Plan   Diagnoses and all orders for this visit:    1. Primary hypertension (Primary)  Comments:  Stable, continue current Rx  Orders:  -     losartan (COZAAR) 25 MG tablet; Take 1 tablet by mouth 2 (Two) Times a Day.  Dispense: 180 tablet; Refill: 1    2. Needs flu shot  -     Fluzone High-Dose 65+yrs    3. Attention deficit hyperactivity disorder (ADHD), predominantly inattentive type  Comments:  stable, she is using Adderall PRN , will continue PRN  Orders:  -     amphetamine-dextroamphetamine (Adderall) 15 MG tablet; Take 1 tablet by mouth As Needed (, ADHD sx).  Dispense: 30 tablet; Refill: 0    4. Long-term use of high-risk medication  -     ToxASSURE Select 13 (MW) - Urine, Clean Catch    5. Lymphedema  Comments:  follow with LE clinic        UDS next OV as she is not able to leave urine sample   PDMP reviwed , looks approperiate   Controlled substance medication agreement forms copy in chart.   Medication sent in.    Adverse effects and risks discussed.    Patient was given instructions and counseling regarding her condition or for health  maintenance advice.   Please see specific information pulled into the AVS if appropriate.       I have fully discussed the nature of the medical condition(s) risks, complications, management, safe and proper use of medications.   Encouraged medication compliance and the importance of keeping scheduled follow up appointments with me and any other providers.    Patient instructed to follow up with our office for results on any labs/imaging ordered during this visit.    Home care discussed  All questions answered  Patient verbalizes understanding and agrees to treatment plan.     Follow up: Return in about 4 weeks (around 11/23/2023) for medicare wellness, come fasting.

## 2023-11-03 DIAGNOSIS — Z00.00 MEDICARE ANNUAL WELLNESS VISIT, SUBSEQUENT: ICD-10-CM

## 2023-11-03 DIAGNOSIS — I10 PRIMARY HYPERTENSION: ICD-10-CM

## 2023-11-03 DIAGNOSIS — Z12.5 SCREENING PSA (PROSTATE SPECIFIC ANTIGEN): ICD-10-CM

## 2023-11-03 DIAGNOSIS — I10 PRIMARY HYPERTENSION: Primary | ICD-10-CM

## 2023-11-03 DIAGNOSIS — E78.5 DYSLIPIDEMIA: ICD-10-CM

## 2023-11-08 ENCOUNTER — HOSPITAL ENCOUNTER (OUTPATIENT)
Dept: OCCUPATIONAL THERAPY | Facility: HOSPITAL | Age: 73
Setting detail: THERAPIES SERIES
Discharge: HOME | End: 2023-11-08
Payer: MEDICARE

## 2023-11-08 DIAGNOSIS — I89.0 LYMPHEDEMA, NOT ELSEWHERE CLASSIFIED: Primary | ICD-10-CM

## 2023-11-08 DIAGNOSIS — I89.0 LYMPHEDEMA OF BOTH LOWER EXTREMITIES: ICD-10-CM

## 2023-11-08 PROCEDURE — 97140 MANUAL THERAPY 1/> REGIONS: CPT

## 2023-11-08 NOTE — THERAPY TREATMENT NOTE
Outpatient Occupational Therapy Lymphedema Treatment Note  [facility]     Patient Name: Abhinav Fields  : 1950  MRN: [id number]  Today's Date: 2023      Visit Date: 2023    Patient Active Problem List   Diagnosis    Blues    HLD (hyperlipidemia)    BP (high blood pressure)    Osteopenia    Borderline diabetes        Past Medical History:   Diagnosis Date    Acute upper respiratory infection     Depression     Encounter for routine gynecological examination with Papanicolaou smear of cervix     Former smoker     H/O bone density study     H/O echocardiogram 2012    H/O mammogram     Health care maintenance     History of EKG 2013    Hyperlipidemia     Hypertension     Osteopenia     Physical exam, routine     Postmenopausal status     Prediabetes     Squamous cell carcinoma of skin         Past Surgical History:   Procedure Laterality Date    FOOT SURGERY Left          Visit Dx:      ICD-10-CM ICD-9-CM   1. Lymphedema, not elsewhere classified  I89.0 457.1   2. Lymphedema of both lower extremities  I89.0 457.1        Lymphedema       Row Name 23 0800             Subjective Pain    Able to rate subjective pain? yes  -CW      Pre-Treatment Pain Level 1  -CW      Post-Treatment Pain Level 1  -CW         Subjective    Subjective Comments Pt. reports R foot pain/soreness at times especially when walking.  -CW         Skin Changes/Observations    Location/Assessment Lower Extremity  -CW      Lower Extremity Conditions bilateral:;normal;intact  -CW         Manual Lymphatic Drainage    Manual Lymphatic Drainage initial sequence;opened regional lymph nodes;opened anastamoses;extremity treatment  -CW      Initial Sequence short neck;abdomen  -CW      Abdomen superficial  -CW      Opened Regional Lymph Nodes axillary;inguinal  -CW      Axillary right;left  -CW      Inguinal right;left  -CW      Opened Anastamoses inguino-axillary  -CW      Inguino-Axillary right;left  -CW      Extremity  Treatment MLD to full limb  -CW      MLD to Full Limb BLE's  -CW         Compression/Skin Care    Compression/Skin Care skin care;wrapping location;bandaging;compression garment;remove bandages  -CW      Skin Care moisturizing lotion applied  -CW      Wrapping Location lower extremity  -CW      Wrapping Location LE bilateral:;ankle;calf  -CW      Wrapping Comments Tg9, 1- 10 cm. Ariflex, 1-8cm. Rosidal.  -CW      Bandage Layers cotton liner;padding/fluff layer;short-stretch bandages (comment size/quantity)  -CW      Bandaging Technique circumferential/spiral;moderate compression  -CW                User Key  (r) = Recorded By, (t) = Taken By, (c) = Cosigned By      Initials Name Provider Type    Latanya Juan OTR Occupational Therapist                             OT Assessment/Plan       Row Name 11/08/23 1324 11/08/23 1323       OT Assessment    Assessment Comments Pt. reports pain R foot especially when walking 1/10. Skin intact with no rashes or skin irritation. Edema increases towards the end of the day. Reviewed lymph. tx. and CDT. MLD sequence completed with pt. supine.No pain or tenderness noted during MLD. Instructed HEP and issued handouts. Pt. states she is going to do more pilates classes along with HEP.  Applied the bandages with moderate compression B ankles to knees. Reviewed how to apply the bandages sequence and technique. Ed. completed regarding bandage precautions and wearing schedule. Pt. is going out of town and is not able to attend tx. consistently next week. Discussed options for long term edema management including compression stockings and velcro compression. Pt. is also interested to possibly obtain a compression pump.  -CW --       OT Plan    OT Plan Comments -- Plan to cont. lymph. tx.  -CW              User Key  (r) = Recorded By, (t) = Taken By, (c) = Cosigned By      Initials Name Provider Type    Latanya Juan OTR Occupational Therapist                        Manual Rx (last  36 hours)       Manual Treatments       Row Name 11/08/23 1332             Total Minutes    90293 - OT Manual Therapy Minutes 58  -CW                User Key  (r) = Recorded By, (t) = Taken By, (c) = Cosigned By      Initials Name Provider Type    Latanya Juan OTR Occupational Therapist                      Therapy Education  Given: Symptoms/condition management, Bandaging/dressing change, Edema management, HEP  Program: New  How Provided: Verbal, Demonstration, Written  Provided to: Patient  Level of Understanding: Verbalized                Time Calculation:   OT Start Time: 0834  OT Stop Time: 0932  OT Time Calculation (min): 58 min  Total Timed Code Minutes- OT: 58 minute(s)  Timed Charges  58718 - OT Manual Therapy Minutes: 58  Total Minutes  Timed Charges Total Minutes: 58   Total Minutes: 58     Therapy Charges for Today       Code Description Service Date Service Provider Modifiers Qty    76020938602 HC OT MANUAL THERAPY EA 15 MIN 11/8/2023 Latanya Augustine OTR GO 4                        RENEA Henry  11/8/2023

## 2023-11-29 LAB
ALBUMIN SERPL-MCNC: 4.3 G/DL (ref 3.5–5.2)
ALBUMIN/GLOB SERPL: 2.5 G/DL
ALP SERPL-CCNC: 56 U/L (ref 39–117)
ALT SERPL-CCNC: 15 U/L (ref 1–33)
AST SERPL-CCNC: 16 U/L (ref 1–32)
BILIRUB SERPL-MCNC: 0.4 MG/DL (ref 0–1.2)
BUN SERPL-MCNC: 20 MG/DL (ref 8–23)
BUN/CREAT SERPL: 27 (ref 7–25)
CALCIUM SERPL-MCNC: 9 MG/DL (ref 8.6–10.5)
CHLORIDE SERPL-SCNC: 104 MMOL/L (ref 98–107)
CHOLEST SERPL-MCNC: 236 MG/DL (ref 0–200)
CO2 SERPL-SCNC: 27.2 MMOL/L (ref 22–29)
CREAT SERPL-MCNC: 0.74 MG/DL (ref 0.57–1)
EGFRCR SERPLBLD CKD-EPI 2021: 85.6 ML/MIN/1.73
ERYTHROCYTE [DISTWIDTH] IN BLOOD BY AUTOMATED COUNT: 13.2 % (ref 12.3–15.4)
GLOBULIN SER CALC-MCNC: 1.7 GM/DL
GLUCOSE SERPL-MCNC: 87 MG/DL (ref 65–99)
HCT VFR BLD AUTO: 42.3 % (ref 34–46.6)
HDLC SERPL-MCNC: 67 MG/DL (ref 40–60)
HGB BLD-MCNC: 13.7 G/DL (ref 12–15.9)
LDLC SERPL CALC-MCNC: 156 MG/DL (ref 0–100)
MCH RBC QN AUTO: 28.6 PG (ref 26.6–33)
MCHC RBC AUTO-ENTMCNC: 32.4 G/DL (ref 31.5–35.7)
MCV RBC AUTO: 88.3 FL (ref 79–97)
PLATELET # BLD AUTO: 293 10*3/MM3 (ref 140–450)
POTASSIUM SERPL-SCNC: 4.7 MMOL/L (ref 3.5–5.2)
PROT SERPL-MCNC: 6 G/DL (ref 6–8.5)
PSA SERPL-MCNC: <0.014 NG/ML (ref 0–4)
RBC # BLD AUTO: 4.79 10*6/MM3 (ref 3.77–5.28)
SODIUM SERPL-SCNC: 139 MMOL/L (ref 136–145)
TRIGL SERPL-MCNC: 78 MG/DL (ref 0–150)
VLDLC SERPL CALC-MCNC: 13 MG/DL (ref 5–40)
WBC # BLD AUTO: 4.85 10*3/MM3 (ref 3.4–10.8)

## 2023-12-01 ENCOUNTER — HOSPITAL ENCOUNTER (OUTPATIENT)
Dept: OCCUPATIONAL THERAPY | Facility: HOSPITAL | Age: 73
Setting detail: THERAPIES SERIES
Discharge: HOME | End: 2023-12-01
Payer: MEDICARE

## 2023-12-01 DIAGNOSIS — I89.0 LYMPHEDEMA OF BOTH LOWER EXTREMITIES: ICD-10-CM

## 2023-12-01 DIAGNOSIS — I89.0 LYMPHEDEMA, NOT ELSEWHERE CLASSIFIED: Primary | ICD-10-CM

## 2023-12-01 PROCEDURE — 97140 MANUAL THERAPY 1/> REGIONS: CPT

## 2023-12-01 NOTE — THERAPY PROGRESS REPORT/RE-CERT
Outpatient Occupational Therapy Lymphedema Progress Note  [facility]     Patient Name: Abhinav Fields  : 1950  MRN: [id number]  Today's Date: 2023      Visit Date: 2023    Patient Active Problem List   Diagnosis    Blues    HLD (hyperlipidemia)    BP (high blood pressure)    Osteopenia    Borderline diabetes        Past Medical History:   Diagnosis Date    Acute upper respiratory infection     Depression     Encounter for routine gynecological examination with Papanicolaou smear of cervix     Former smoker     H/O bone density study     H/O echocardiogram 2012    H/O mammogram     Health care maintenance     History of EKG 2013    Hyperlipidemia     Hypertension     Osteopenia     Physical exam, routine     Postmenopausal status     Prediabetes     Squamous cell carcinoma of skin         Past Surgical History:   Procedure Laterality Date    FOOT SURGERY Left          Visit Dx:      ICD-10-CM ICD-9-CM   1. Lymphedema, not elsewhere classified  I89.0 457.1   2. Lymphedema of both lower extremities  I89.0 457.1        Lymphedema       Row Name 23 1000             Subjective Pain    Able to rate subjective pain? yes  -CW      Pre-Treatment Pain Level 0  -CW      Post-Treatment Pain Level 0  -CW         Subjective    Subjective Comments Pt. reports no LE pain at present when at rest.  -CW         Skin Changes/Observations    Location/Assessment Lower Extremity  -CW      Lower Extremity Conditions bilateral:;normal;intact  -CW         Lymphedema Measurements    Measurement Type(s) Circumferential  -CW      Circumferential Areas Lower extremities  -CW         BLE Circumferential (cm)    Measurement Location 1 10 cm. above knee  -CW      Left 1 46 cm  -CW      Right 1 45.2 cm  -CW      Measurement Location 2 knee  -CW      Left 2 37.2 cm  -CW      Right 2 36.5 cm  -CW      Measurement Location 3 10 cm. below knee  -CW      Left 3 42.9 cm  -CW      Right 3 42.5 cm  -CW      Measurement  Location 4 20 cm. below knee  -CW      Left 4 32.7 cm  -CW      Right 4 33.5 cm  -CW      Measurement Location 5 30 cm. below knee  -CW      Left 5 25 cm  -CW      Right 5 24.5 cm  -CW      Measurement Location 6 ankle  -CW      Left 6 27 cm  -CW      Right 6 26.2 cm  -CW      Measurement Location 7 mid foot  -CW      Left 7 21 cm  -CW      Right 7 20.3 cm  -CW      LLE Circumferential Total 231.8 cm  -CW      RLE Circumferential Total 228.7 cm  -CW         Manual Lymphatic Drainage    Manual Lymphatic Drainage initial sequence;opened regional lymph nodes;opened anastamoses;extremity treatment  -CW      Initial Sequence short neck;abdomen  -CW      Abdomen superficial  -CW      Opened Regional Lymph Nodes axillary;inguinal  -CW      Axillary right;left  -CW      Inguinal right;left  -CW      Opened Anastamoses inguino-axillary  -CW      Inguino-Axillary right;left  -CW      Extremity Treatment MLD to full limb  -CW      MLD to Full Limb BLE's  -CW         Compression/Skin Care    Wrapping Comments Pt. left bandages at home.  -CW                User Key  (r) = Recorded By, (t) = Taken By, (c) = Cosigned By      Initials Name Provider Type    Latanya Juan OTR Occupational Therapist                             OT Assessment/Plan       Row Name 12/01/23 1306 12/01/23 1305       OT Assessment    Assessment Comments No pain complaints at present BLE's when at rest. Pt. has been applying the compression bandages at night and intermittently during the day. Skin dry and intact with no wounds or skin irritation. MLD sequence completed with pt. supine. No discomfort during MLD. Reviewed bandage wearing schedule and issued tubigrip size G as a liner option. Pt. presents with moderate non- pitting edema stage 2 with heaviness, and beginning fibrosis, hyperkeratosis noted B lower legs. Pt. was measured for compression stockings and velcro compression 11/29/23 with Medi rep present. Measurements taken. See flow sheet. Total  circumference .7 cm. and .8 cm. (4.2 cm. R and 2.1 cm. L net decrease). Pt. seems to be responding to compression.  Goals updated and reviewed POC with pt. Pt. prefers to wait until 1/1/24 to order her compression items. Pt has tried conservative tx. for >28 days including gradient compression bandages,  elevation BLE's, HEP ( goes to pilates classes). Even though pt. has tried these conservative tx. methods, pt.'s symptoms will most likely still persist. This may lead to advanced skin changes and other edema related symptoms including increased fibrosis, edema, or infections. Pt. may benefit from a compression pump to better manage her chronic edema.  -CW --       OT Plan    OT Plan Comments -- Plan to follow up with pt. as needed after 1/1/24.  -CW              User Key  (r) = Recorded By, (t) = Taken By, (c) = Cosigned By      Initials Name Provider Type    Latanya Juan OTR Occupational Therapist                        Manual Rx (last 36 hours)       Manual Treatments       Row Name 12/01/23 1322             Total Minutes    34370 - OT Manual Therapy Minutes 60  -CW                User Key  (r) = Recorded By, (t) = Taken By, (c) = Cosigned By      Initials Name Provider Type    Latanya Juan OTR Occupational Therapist                   OT Goals       Row Name 12/01/23 1300          OT Short Term Goals    STG Date to Achieve 10/20/23  -CW     STG 1 Patient to demonstrate proper awareness of “What is Lymphedema” and DO’s and Don’ts” for improved prevention, management, care of symptoms, and ease of transition to self-care of condition.  -CW     STG 1 Progress Progressing;Ongoing  -CW     STG 2 Patient independent and compliant with self-wrapping techniques of compression bandages with family member or caregiver as indicated for improved self-management of condition.  -CW     STG 2 Progress Met  -CW     STG 3 Patient demonstrate decreased net edema of  BLE's  >/5-10 cm. for decreased in edema,  symptoms, decreased risk of infection, and improved skin-care/transition to self-care of condition.  -CW     STG 3 Progress Ongoing;Progressing  -CW        Long Term Goals    LTG Date to Achieve 11/03/23  -CW     LTG 1 Patient independent and compliant with home exercise program (as able) focused on range of motion, flexibility, to improve lymphatic flow and discomfort.  -CW     LTG 1 Progress Ongoing;Progressing  -CW     LTG 2 Patient will demonstrate decreased net edema of  BLE's >/=10-20 cm. for decrease in symptoms, decreased risk of infection, and improved skin-care/transition to self-care of condition.  -CW     LTG 2 Progress Ongoing;Progressing  -CW     LTG 3 Patient independent and compliant with use and care of compression (example garments, inelastic velcro compression) with assistance of a caregiver as needed to promote self-care independence.  -CW     LTG 3 Progress Progressing;Ongoing  -CW     LTG 4 Improve LLIS Lower extremity Life Impact scale score by 4 points.  -CW     LTG 4 Progress Ongoing  -CW               User Key  (r) = Recorded By, (t) = Taken By, (c) = Cosigned By      Initials Name Provider Type    Latanya Juan OTR Occupational Therapist                    Therapy Education  Given: Edema management, Symptoms/condition management, Bandaging/dressing change  Program: Reinforced  How Provided: Verbal, Demonstration  Provided to: Patient  Level of Understanding: Verbalized                Time Calculation:   OT Start Time: 1017  OT Stop Time: 1117  OT Time Calculation (min): 60 min  Total Timed Code Minutes- OT: 60 minute(s)  Timed Charges  41545 - OT Manual Therapy Minutes: 60  Total Minutes  Timed Charges Total Minutes: 60   Total Minutes: 60     Therapy Charges for Today       Code Description Service Date Service Provider Modifiers Qty    [id number]  OT MANUAL THERAPY EA 15 MIN 12/1/2023 Latanya Augustine OTR GO 4                        RENEA Henry  12/1/2023

## 2023-12-03 LAB — DRUGS UR: NORMAL

## 2023-12-05 ENCOUNTER — OFFICE VISIT (OUTPATIENT)
Dept: FAMILY MEDICINE CLINIC | Facility: CLINIC | Age: 73
End: 2023-12-05
Payer: MEDICARE

## 2023-12-05 VITALS
OXYGEN SATURATION: 97 % | BODY MASS INDEX: 28.23 KG/M2 | SYSTOLIC BLOOD PRESSURE: 116 MMHG | WEIGHT: 143.8 LBS | DIASTOLIC BLOOD PRESSURE: 80 MMHG | HEIGHT: 60 IN | HEART RATE: 67 BPM | RESPIRATION RATE: 16 BRPM | TEMPERATURE: 97.1 F

## 2023-12-05 DIAGNOSIS — F90.0 ATTENTION DEFICIT HYPERACTIVITY DISORDER (ADHD), PREDOMINANTLY INATTENTIVE TYPE: ICD-10-CM

## 2023-12-05 DIAGNOSIS — E78.5 DYSLIPIDEMIA: ICD-10-CM

## 2023-12-05 DIAGNOSIS — M25.461 SWELLING OF BOTH KNEES: ICD-10-CM

## 2023-12-05 DIAGNOSIS — N32.9 LESION OF URINARY BLADDER: ICD-10-CM

## 2023-12-05 DIAGNOSIS — I89.0 LYMPHEDEMA: ICD-10-CM

## 2023-12-05 DIAGNOSIS — I10 PRIMARY HYPERTENSION: ICD-10-CM

## 2023-12-05 DIAGNOSIS — M25.462 SWELLING OF BOTH KNEES: ICD-10-CM

## 2023-12-05 DIAGNOSIS — R10.13 EPIGASTRIC DISCOMFORT: ICD-10-CM

## 2023-12-05 DIAGNOSIS — Z23 NEED FOR PROPHYLACTIC VACCINATION AGAINST STREPTOCOCCUS PNEUMONIAE (PNEUMOCOCCUS): ICD-10-CM

## 2023-12-05 DIAGNOSIS — Z00.00 MEDICARE ANNUAL WELLNESS VISIT, SUBSEQUENT: Primary | ICD-10-CM

## 2023-12-05 NOTE — PATIENT INSTRUCTIONS
Medicare Wellness  Personal Prevention Plan of Service     Date of Office Visit:    Encounter Provider:  Haeven Grigsby MD  Place of Service:  [facility]  Patient Name: Abhinav Fields  :  1950    As part of the Medicare Wellness portion of your visit today, we are providing you with this personalized preventive plan of services (PPPS). This plan is based upon recommendations of the [place] Preventive Services Task Force (USPSTF) and the Advisory Committee on Immunization Practices (ACIP).    This lists the preventive care services that should be considered, and provides dates of when you are due. Items listed as completed are up-to-date and do not require any further intervention.    Health Maintenance   Topic Date Due    TDAP/TD VACCINES (1 - Tdap) Never done    Pneumococcal Vaccine 65+ (1 - PCV) Never done    HEPATITIS C SCREENING  Never done    ANNUAL WELLNESS VISIT  Never done    COVID-19 Vaccine (4 - - season) 2024 (Originally 2023)    DXA SCAN  2024 (Originally 1950)    ZOSTER VACCINE (2 of 3) 2024 (Originally 12/10/2015)    BMI FOLLOWUP  10/26/2024    LIPID PANEL  2024    COLORECTAL CANCER SCREENING  2025    MAMMOGRAM  2025    INFLUENZA VACCINE  Completed       Orders Placed This Encounter   Procedures    Pneumococcal Conjugate Vaccine 20-Valent (PCV20)       No follow-ups on file.

## 2023-12-05 NOTE — PROGRESS NOTES
The ABCs of the Annual Wellness Visit  Subsequent Medicare Wellness Visit    Subjective    Abhinav Fields is a 73 y.o. female who presents for a Subsequent Medicare Wellness Visit.    The following portions of the patient's history were reviewed and   updated as appropriate: allergies, current medications, past family history, past medical history, past social history, past surgical history, and problem list.    Compared to one year ago, the patient feels her physical   health is the same.    Compared to one year ago, the patient feels her mental   health is the same.    Recent Hospitalizations:  She was not admitted to the hospital during the last year.       Current Medical Providers:  Patient Care Team:  Heaven Grigsby MD as PCP - General (Urgent Care)    Outpatient Medications Prior to Visit   Medication Sig Dispense Refill    amphetamine-dextroamphetamine (Adderall) 15 MG tablet Take 1 tablet by mouth As Needed (, ADHD sx). 30 tablet 0    cetirizine (zyrTEC) 10 MG tablet Take 1 tablet by mouth Daily.      fluorouracil (EFUDEX) 5 % cream Apply 1 application  topically to the appropriate area as directed As Needed.      furosemide (LASIX) 40 MG tablet Take 1 tablet by mouth As Needed.      losartan (COZAAR) 25 MG tablet Take 1 tablet by mouth 2 (Two) Times a Day. 180 tablet 1    Magnesium 400 MG tablet Take  by mouth As Needed.      melatonin 5 MG tablet tablet Take 1 tablet by mouth.       No facility-administered medications prior to visit.       No opioid medication identified on active medication list. I have reviewed chart for other potential  high risk medication/s and harmful drug interactions in the elderly.        Aspirin is not on active medication list.  Aspirin use is not indicated based on review of current medical condition/s. Risk of harm outweighs potential benefits.  .    Patient Active Problem List   Diagnosis    Blues    HLD (hyperlipidemia)    BP (high blood pressure)    Osteopenia     "Borderline diabetes     Advance Care Planning   Advance Care Planning     Advance Directive is not on file.  ACP discussion was held with the patient during this visit. Patient has an advance directive (not in EMR), copy requested.     Objective    Vitals:    23 0932   BP: 116/80   Pulse: 67   Resp: 16   Temp: 97.1 °F (36.2 °C)   SpO2: 97%   Weight: 65.2 kg (143 lb 12.8 oz)   Height: 152.4 cm (60\")   PainSc: 0-No pain     Estimated body mass index is 28.08 kg/m² as calculated from the following:    Height as of this encounter: 152.4 cm (60\").    Weight as of this encounter: 65.2 kg (143 lb 12.8 oz).           Does the patient have evidence of cognitive impairment? No    Lab Results   Component Value Date    CHLPL 236 (H) 2023    TRIG 78 2023    HDL 67 (H) 2023     (H) 2023    VLDL 13 2023        HEALTH RISK ASSESSMENT    Smoking Status:  Social History     Tobacco Use   Smoking Status Former    Packs/day: 0.50    Years: 20.00    Additional pack years: 0.00    Total pack years: 10.00    Types: Cigarettes    Quit date:     Years since quittin.9   Smokeless Tobacco Never     Alcohol Consumption:  Social History     Substance and Sexual Activity   Alcohol Use Yes    Comment: Wine     Fall Risk Screen:    YUMIKO Fall Risk Assessment was completed, and patient is at LOW risk for falls.Assessment completed on:2023    Depression Screenin/5/2023     9:41 AM   PHQ-2/PHQ-9 Depression Screening   Little Interest or Pleasure in Doing Things 0-->not at all   Feeling Down, Depressed or Hopeless 1-->several days   PHQ-9: Brief Depression Severity Measure Score 1       Health Habits and Functional and Cognitive Screenin/5/2023     9:37 AM   Functional & Cognitive Status   Do you have difficulty preparing food and eating? No   Do you have difficulty bathing yourself, getting dressed or grooming yourself? No   Do you have difficulty using the toilet? No   Do " you have difficulty moving around from place to place? No   Do you have trouble with steps or getting out of a bed or a chair? No   Current Diet Low Carb Diet   Dental Exam Up to date   Eye Exam Up to date   Exercise (times per week) 7 times per week   Current Exercises Include Walking   Do you need help using the phone?  No   Are you deaf or do you have serious difficulty hearing?  No   Do you need help to go to places out of walking distance? No   Do you need help shopping? No   Do you need help preparing meals?  No   Do you need help with housework?  No   Do you need help with laundry? No   Do you need help taking your medications? No   Do you need help managing money? No   Do you ever drive or ride in a car without wearing a seat belt? No   Have you felt unusual stress, anger or loneliness in the last month? No   Who do you live with? Alone   If you need help, do you have trouble finding someone available to you? No   Have you been bothered in the last four weeks by sexual problems? No   Do you have difficulty concentrating, remembering or making decisions? Yes       Age-appropriate Screening Schedule:  Refer to the list below for future screening recommendations based on patient's age, sex and/or medical conditions. Orders for these recommended tests are listed in the plan section. The patient has been provided with a written plan.    Health Maintenance   Topic Date Due    TDAP/TD VACCINES (1 - Tdap) Never done    HEPATITIS C SCREENING  Never done    COVID-19 Vaccine (4 - 2023-24 season) 12/02/2024 (Originally 9/1/2023)    DXA SCAN  12/02/2024 (Originally 1950)    ZOSTER VACCINE (2 of 3) 12/02/2024 (Originally 12/10/2015)    BMI FOLLOWUP  10/26/2024    LIPID PANEL  11/28/2024    ANNUAL WELLNESS VISIT  12/05/2024    COLORECTAL CANCER SCREENING  06/14/2025    MAMMOGRAM  08/08/2025    INFLUENZA VACCINE  Completed    Pneumococcal Vaccine 65+  Completed                  CMS Preventative Services Quick  "Reference  Risk Factors Identified During Encounter  Immunizations Discussed/Encouraged: Shingrix and COVID19  Dental Screening Recommended  Vision Screening Recommended  The above risks/problems have been discussed with the patient.  Pertinent information has been shared with the patient in the After Visit Summary.  An After Visit Summary and PPPS were made available to the patient.    Follow Up:   Next Medicare Wellness visit to be scheduled in 1 year.       Additional E&M Note during same encounter follows:  Patient has multiple medical problems which are significant and separately identifiable that require additional work above and beyond the Medicare Wellness Visit.      Chief Complaint  Medicare Wellness-subsequent (Patient has a living will.); Bloated (Last couple days); discuss labs; and feeling stomach lump , some times     Subjective        HPI  Abhinav Fields is also being seen today for the followings concern    She is c/o abd bloating with feeling a lump in her stomach area, not all the time. Has normal BM.  She is concerning for elevated BUN/Creat ration. Discussed labs, she has normal GFR and serum creat,.  Has Lymphedema , wearing pressure socks with some help but makes her both knees swollen. Takes lasix with some help. Following with LE clinic.  She is requesting referral to urology as she had h/o benign lesion on her bladder in 2020 ( when she was in [place]),  had cystoscopy was neg. She will provide document for that   ADHD; she take Adderall PRN , not daily              Objective   Vital Signs:  /80   Pulse 67   Temp 97.1 °F (36.2 °C)   Resp 16   Ht 152.4 cm (60\")   Wt 65.2 kg (143 lb 12.8 oz)   SpO2 97%   BMI 28.08 kg/m²     Physical Exam  Vitals and nursing note reviewed.   Constitutional:       General: She is not in acute distress.     Appearance: She is not ill-appearing or toxic-appearing.   HENT:      Mouth/Throat:      Mouth: Mucous membranes are dry.   Eyes:      Extraocular " Movements: Extraocular movements intact.      Conjunctiva/sclera: Conjunctivae normal.      Pupils: Pupils are equal, round, and reactive to light.   Cardiovascular:      Rate and Rhythm: Normal rate and regular rhythm.      Heart sounds: Normal heart sounds. No murmur heard.  Pulmonary:      Effort: Pulmonary effort is normal. No respiratory distress.      Breath sounds: Normal breath sounds. No stridor. No wheezing or rhonchi.   Abdominal:      General: Bowel sounds are normal. There is no distension.      Palpations: Abdomen is soft. There is no mass.      Tenderness: There is no abdominal tenderness. There is no guarding or rebound.      Hernia: No hernia is present.   Musculoskeletal:      Cervical back: Neck supple.      Right lower leg: Edema present.      Left lower leg: Edema present.   Skin:     General: Skin is warm.      Coloration: Skin is not pale.   Neurological:      Mental Status: She is alert and oriented to person, place, and time.   Psychiatric:         Mood and Affect: Mood normal.         Behavior: Behavior normal.         Thought Content: Thought content normal.          The following data was reviewed by: Heaven Grigsby MD on 12/05/2023:  CMP          11/28/2023    10:16   CMP   Glucose 87    BUN 20    Creatinine 0.74    Sodium 139    Potassium 4.7    Chloride 104    Calcium 9.0    Total Protein 6.0    Albumin 4.3    Globulin 1.7    Total Bilirubin 0.4    Alkaline Phosphatase 56    AST (SGOT) 16    ALT (SGPT) 15    BUN/Creatinine Ratio 27.0      CBC          11/28/2023    10:16   CBC   WBC 4.85    RBC 4.79    Hemoglobin 13.7    Hematocrit 42.3    MCV 88.3    MCH 28.6    MCHC 32.4    RDW 13.2    Platelets 293      Lipid Panel          11/28/2023    10:16   Lipid Panel   Total Cholesterol 236    Triglycerides 78    HDL Cholesterol 67    VLDL Cholesterol 13    LDL Cholesterol  156                 Assessment and Plan   Diagnoses and all orders for this visit:    1. Medicare annual wellness  visit, subsequent (Primary)  Comments:  disccussed all labs results    2. Primary hypertension  -     Comprehensive metabolic panel; Future    3. Dyslipidemia  -     Lipid panel; Future    4. Attention deficit hyperactivity disorder (ADHD), predominantly inattentive type  Comments:  stable, continue Rx prn    5. Lymphedema    6. Need for prophylactic vaccination against Streptococcus pneumoniae (pneumococcus)  -     Pneumococcal Conjugate Vaccine 20-Valent (PCV20)    7. Swelling of both knees  Comments:  LIKELY FROM WEARING BELWO KNEE PRESSURE SOCKS.   advised to get above knee PS    8. Epigastric discomfort  Comments:  exam benign, no concern for mass    9. Lesion of urinary bladder  Comments:  s/p removal   had Cystoscope , was neg   she will provide us with records           We discussed preventative care including age and patient-appropriate immunizations and cancer screening. We also discussed the importance of exercise and a healthy diet. This is their annual preventative exam.      I spent 55 minutes caring for Radha on this date of service. This time includes time spent by me in the following activities:preparing for the visit, reviewing tests, obtaining and/or reviewing a separately obtained history, performing a medically appropriate examination and/or evaluation , counseling and educating the patient/family/caregiver, ordering medications, tests, or procedures, referring and communicating with other health care professionals , documenting information in the medical record, independently interpreting results and communicating that information with the patient/family/caregiver and care coordination    I spent 20 minutes caring for patient on this date of service. This time includes time spent by me in the following activities: Reviewing tests, obtaining and/or reviewing a separately obtained history, counseling and educating the patient/family/caregiver, ordering medications, tests, or procedures, referring  and communicating with other health care professionals  and documenting information in the medical record                Follow Up   Return in about 6 months (around 6/5/2024) for follow up on current illness , labs before apointment. eleonora.  Patient was given instructions and counseling regarding her condition or for health maintenance advice. Please see specific information pulled into the AVS if appropriate.

## 2023-12-19 DIAGNOSIS — I10 PRIMARY HYPERTENSION: ICD-10-CM

## 2023-12-19 RX ORDER — LOSARTAN POTASSIUM 25 MG/1
25 TABLET ORAL 2 TIMES DAILY
Qty: 180 TABLET | Refills: 1 | Status: SHIPPED | OUTPATIENT
Start: 2023-12-19

## 2024-01-23 ENCOUNTER — OFFICE VISIT (OUTPATIENT)
Dept: INTERNAL MEDICINE | Facility: CLINIC | Age: 74
End: 2024-01-23
Payer: MEDICARE

## 2024-01-23 VITALS
HEART RATE: 73 BPM | SYSTOLIC BLOOD PRESSURE: 124 MMHG | OXYGEN SATURATION: 98 % | TEMPERATURE: 97.3 F | WEIGHT: 141 LBS | BODY MASS INDEX: 27.68 KG/M2 | HEIGHT: 60 IN | DIASTOLIC BLOOD PRESSURE: 70 MMHG

## 2024-01-23 DIAGNOSIS — E78.5 HYPERLIPIDEMIA, UNSPECIFIED HYPERLIPIDEMIA TYPE: ICD-10-CM

## 2024-01-23 DIAGNOSIS — I10 HYPERTENSION, UNSPECIFIED TYPE: ICD-10-CM

## 2024-01-23 DIAGNOSIS — F90.9 ATTENTION DEFICIT HYPERACTIVITY DISORDER (ADHD), UNSPECIFIED ADHD TYPE: ICD-10-CM

## 2024-01-23 DIAGNOSIS — M79.89 LEG SWELLING: Primary | ICD-10-CM

## 2024-01-23 DIAGNOSIS — R06.09 DYSPNEA ON EXERTION: ICD-10-CM

## 2024-01-23 PROCEDURE — 1159F MED LIST DOCD IN RCRD: CPT | Performed by: STUDENT IN AN ORGANIZED HEALTH CARE EDUCATION/TRAINING PROGRAM

## 2024-01-23 PROCEDURE — 3074F SYST BP LT 130 MM HG: CPT | Performed by: STUDENT IN AN ORGANIZED HEALTH CARE EDUCATION/TRAINING PROGRAM

## 2024-01-23 PROCEDURE — 99214 OFFICE O/P EST MOD 30 MIN: CPT | Performed by: STUDENT IN AN ORGANIZED HEALTH CARE EDUCATION/TRAINING PROGRAM

## 2024-01-23 PROCEDURE — 1160F RVW MEDS BY RX/DR IN RCRD: CPT | Performed by: STUDENT IN AN ORGANIZED HEALTH CARE EDUCATION/TRAINING PROGRAM

## 2024-01-23 PROCEDURE — 3078F DIAST BP <80 MM HG: CPT | Performed by: STUDENT IN AN ORGANIZED HEALTH CARE EDUCATION/TRAINING PROGRAM

## 2024-01-23 RX ORDER — ATORVASTATIN CALCIUM 40 MG/1
40 TABLET, FILM COATED ORAL DAILY
Qty: 90 TABLET | Refills: 2 | Status: SHIPPED | OUTPATIENT
Start: 2024-01-23

## 2024-01-23 NOTE — PROGRESS NOTES
"Chief Complaint  Establish Care, Shortness of Breath, Leg Swelling, and Hernia    Subjective        Abhinav Fields presents to [facility] PRIMARY CARE  History of Present Illness    Ms. Fields is a 74yo F who presents to clinic today as a new patient to establish care with complaints of chronic leg swelling, dyspnea on exertion, and for management of chronic conditions    She was previously following with another provider within [facility] however is transferring care here     She reports of chronic leg swelling dating back to 2016 in which she had an operation on her varicose veins at that time.  She currently is being seen by lymphedema clinic and currently does compression stockings, and pumps however reports minimal improvement. She also reports of some associated dyspnea, specifically with exertion that is new. Denies any chest pain, orthopnea, palpitations. She states she saw Rene Sharma who does craniosacral work for the dyspnea and reported improvement.     She also suspects she has a hernia and is interested in getting looked at. She is not interested in necessarily having any intervention but reports dissatisfaction with previous provider not working up further.       Review of Systems   Constitutional:  Negative for fever.   Respiratory:  Positive for shortness of breath. Negative for chest tightness.    Cardiovascular:  Positive for leg swelling. Negative for chest pain.   Gastrointestinal:  Negative for abdominal pain, nausea and vomiting.   Neurological:  Negative for dizziness and light-headedness.        Objective   Vital Signs:  /70   Pulse 73   Temp 97.3 °F (36.3 °C) (Temporal)   Ht 152.4 cm (60\")   Wt 64 kg (141 lb)   SpO2 98%   BMI 27.54 kg/m²   Estimated body mass index is 27.54 kg/m² as calculated from the following:    Height as of this encounter: 152.4 cm (60\").    Weight as of this encounter: 64 kg (141 lb).               Physical Exam  Vitals reviewed. "   Constitutional:       General: She is not in acute distress.     Appearance: Normal appearance. She is not ill-appearing.   HENT:      Head: Normocephalic and atraumatic.   Eyes:      General: No scleral icterus.     Extraocular Movements: Extraocular movements intact.      Conjunctiva/sclera: Conjunctivae normal.   Cardiovascular:      Rate and Rhythm: Normal rate and regular rhythm.      Heart sounds: Normal heart sounds. No murmur heard.  Pulmonary:      Effort: Pulmonary effort is normal. No respiratory distress.      Breath sounds: No rales.   Abdominal:      General: Abdomen is flat.      Palpations: Abdomen is soft. There is no mass.      Tenderness: There is no abdominal tenderness.      Hernia: Hernia is present in the ventral area.   Musculoskeletal:         General: No swelling or deformity. Normal range of motion.      Right lower le+ Edema present.      Left lower le+ Edema present.   Skin:     General: Skin is warm and dry.   Neurological:      General: No focal deficit present.      Mental Status: She is alert and oriented to person, place, and time.      Motor: No weakness.      Gait: Gait normal.   Psychiatric:         Mood and Affect: Mood normal.         Behavior: Behavior normal.        Result Review :  The following data was reviewed by: Luis Miguel Howard MD on 2024:  CMP          2023    10:16   CMP   Glucose 87    BUN 20    Creatinine 0.74    Sodium 139    Potassium 4.7    Chloride 104    Calcium 9.0    Total Protein 6.0    Albumin 4.3    Globulin 1.7    Total Bilirubin 0.4    Alkaline Phosphatase 56    AST (SGOT) 16    ALT (SGPT) 15    BUN/Creatinine Ratio 27.0      CBC          2023    10:16   CBC   WBC 4.85    RBC 4.79    Hemoglobin 13.7    Hematocrit 42.3    MCV 88.3    MCH 28.6    MCHC 32.4    RDW 13.2    Platelets 293      Lipid Panel          2023    10:16   Lipid Panel   Total Cholesterol 236    Triglycerides 78    HDL Cholesterol 67    VLDL Cholesterol  13    LDL Cholesterol  156                   Assessment and Plan   Diagnoses and all orders for this visit:    1. Leg swelling (Primary)  -     Adult Transthoracic Echo Complete W/ Cont if Necessary Per Protocol; Future    2. Dyspnea on exertion  -     Adult Transthoracic Echo Complete W/ Cont if Necessary Per Protocol; Future    3. Hypertension, unspecified type    4. Attention deficit hyperactivity disorder (ADHD), unspecified ADHD type    5. Hyperlipidemia, unspecified hyperlipidemia type  -     atorvastatin (LIPITOR) 40 MG tablet; Take 1 tablet by mouth Daily.  Dispense: 90 tablet; Refill: 2      Ms. Fields is a 74yo F who presents to clinic today as a new patient to establish care with complaints of chronic leg swelling, dyspnea on exertion, and for management of chronic conditions    She reports of chronic leg swelling presumed to be from lymphedema with new onset associated dyspnea on exertion. Review of previous cardiology OV, previous PCP and lymphedema notes where it was suspected from chronic venous insufficiency. Her exam shows no signs of volume overload and edema is bilateral and non-pitting however given her new onset of dyspnea will obtain Echo for further evaluation as has risk factors for ASCVD (HLD, former smoker, HTN)    Hyperlipidemia appears newly diagnosed, will initiate atorvastatin given ASCVD risk at 16.5%. Counseled on possible side effects    Her hernia is very small and appears umbilical/ventral area with reducibility. She does not want to have intervention at this time so I discussed there is low utility in obtaining imaging given exam findings. Will continue to monitor     Will follow up in two months after echo findings and plan to repeat lipid panel at that time         Follow Up   Return in about 8 weeks (around 3/19/2024).  Patient was given instructions and counseling regarding her condition or for health maintenance advice. Please see specific information pulled into the AVS if  appropriate.

## 2024-01-30 ENCOUNTER — TELEPHONE (OUTPATIENT)
Dept: CARDIOLOGY | Facility: CLINIC | Age: 74
End: 2024-01-30
Payer: MEDICARE

## 2024-01-31 ENCOUNTER — HOSPITAL ENCOUNTER (OUTPATIENT)
Dept: CARDIOLOGY | Facility: HOSPITAL | Age: 74
Discharge: HOME | End: 2024-01-31
Admitting: STUDENT IN AN ORGANIZED HEALTH CARE EDUCATION/TRAINING PROGRAM
Payer: MEDICARE

## 2024-01-31 VITALS
BODY MASS INDEX: 27.68 KG/M2 | HEART RATE: 65 BPM | SYSTOLIC BLOOD PRESSURE: 160 MMHG | WEIGHT: 141 LBS | HEIGHT: 60 IN | DIASTOLIC BLOOD PRESSURE: 94 MMHG | OXYGEN SATURATION: 96 %

## 2024-01-31 DIAGNOSIS — R06.09 DYSPNEA ON EXERTION: ICD-10-CM

## 2024-01-31 DIAGNOSIS — M79.89 LEG SWELLING: ICD-10-CM

## 2024-01-31 LAB
AORTIC ARCH: 2.4 CM
ASCENDING AORTA: 3 CM
BH CV ECHO MEAS - ACS: 1.76 CM
BH CV ECHO MEAS - AO MAX PG: 8.8 MMHG
BH CV ECHO MEAS - AO MEAN PG: 5 MMHG
BH CV ECHO MEAS - AO ROOT DIAM: 2.7 CM
BH CV ECHO MEAS - AO V2 MAX: 148 CM/SEC
BH CV ECHO MEAS - AO V2 VTI: 36.7 CM
BH CV ECHO MEAS - AVA(I,D): 2 CM2
BH CV ECHO MEAS - EDV(CUBED): 46.7 ML
BH CV ECHO MEAS - EDV(MOD-SP2): 57 ML
BH CV ECHO MEAS - EDV(MOD-SP4): 58 ML
BH CV ECHO MEAS - EF(MOD-BP): 66.6 %
BH CV ECHO MEAS - EF(MOD-SP2): 64.9 %
BH CV ECHO MEAS - EF(MOD-SP4): 67.2 %
BH CV ECHO MEAS - ESV(CUBED): 12.6 ML
BH CV ECHO MEAS - ESV(MOD-SP2): 20 ML
BH CV ECHO MEAS - ESV(MOD-SP4): 19 ML
BH CV ECHO MEAS - FS: 35.4 %
BH CV ECHO MEAS - IVS/LVPW: 1.33 CM
BH CV ECHO MEAS - IVSD: 1 CM
BH CV ECHO MEAS - LAT PEAK E' VEL: 7.9 CM/SEC
BH CV ECHO MEAS - LV DIASTOLIC VOL/BSA (35-75): 36 CM2
BH CV ECHO MEAS - LV MASS(C)D: 89.2 GRAMS
BH CV ECHO MEAS - LV MAX PG: 3.7 MMHG
BH CV ECHO MEAS - LV MEAN PG: 2 MMHG
BH CV ECHO MEAS - LV SYSTOLIC VOL/BSA (12-30): 11.8 CM2
BH CV ECHO MEAS - LV V1 MAX: 96.8 CM/SEC
BH CV ECHO MEAS - LV V1 VTI: 23 CM
BH CV ECHO MEAS - LVIDD: 3.6 CM
BH CV ECHO MEAS - LVIDS: 2.33 CM
BH CV ECHO MEAS - LVOT AREA: 3.2 CM2
BH CV ECHO MEAS - LVOT DIAM: 2.02 CM
BH CV ECHO MEAS - LVPWD: 0.75 CM
BH CV ECHO MEAS - MED PEAK E' VEL: 6.8 CM/SEC
BH CV ECHO MEAS - MR MAX PG: 58.1 MMHG
BH CV ECHO MEAS - MR MAX VEL: 381 CM/SEC
BH CV ECHO MEAS - MV A DUR: 0.13 SEC
BH CV ECHO MEAS - MV A MAX VEL: 95.1 CM/SEC
BH CV ECHO MEAS - MV DEC SLOPE: 402.1 CM/SEC2
BH CV ECHO MEAS - MV DEC TIME: 0.21 SEC
BH CV ECHO MEAS - MV E MAX VEL: 67.3 CM/SEC
BH CV ECHO MEAS - MV E/A: 0.71
BH CV ECHO MEAS - MV MAX PG: 4.7 MMHG
BH CV ECHO MEAS - MV MEAN PG: 1.69 MMHG
BH CV ECHO MEAS - MV P1/2T: 62.7 MSEC
BH CV ECHO MEAS - MV V2 VTI: 31.5 CM
BH CV ECHO MEAS - MVA(P1/2T): 3.5 CM2
BH CV ECHO MEAS - MVA(VTI): 2.33 CM2
BH CV ECHO MEAS - PA ACC TIME: 0.12 SEC
BH CV ECHO MEAS - PA V2 MAX: 110.5 CM/SEC
BH CV ECHO MEAS - PI END-D VEL: 85.3 CM/SEC
BH CV ECHO MEAS - PULM A REVS DUR: 0.11 SEC
BH CV ECHO MEAS - PULM A REVS VEL: 31.7 CM/SEC
BH CV ECHO MEAS - PULM DIAS VEL: 38.1 CM/SEC
BH CV ECHO MEAS - PULM S/D: 1.38
BH CV ECHO MEAS - PULM SYS VEL: 52.7 CM/SEC
BH CV ECHO MEAS - RAP SYSTOLE: 3 MMHG
BH CV ECHO MEAS - RV MAX PG: 2.7 MMHG
BH CV ECHO MEAS - RV V1 MAX: 81.4 CM/SEC
BH CV ECHO MEAS - RV V1 VTI: 19.1 CM
BH CV ECHO MEAS - RVSP: 23 MMHG
BH CV ECHO MEAS - SI(MOD-SP2): 23 ML/M2
BH CV ECHO MEAS - SI(MOD-SP4): 24.2 ML/M2
BH CV ECHO MEAS - SUP REN AO DIAM: 1.9 CM
BH CV ECHO MEAS - SV(LVOT): 73.5 ML
BH CV ECHO MEAS - SV(MOD-SP2): 37 ML
BH CV ECHO MEAS - SV(MOD-SP4): 39 ML
BH CV ECHO MEAS - TR MAX PG: 20.1 MMHG
BH CV ECHO MEAS - TR MAX VEL: 224 CM/SEC
BH CV ECHO MEASUREMENTS AVERAGE E/E' RATIO: 9.16
BH CV XLRA - RV BASE: 3 CM
BH CV XLRA - RV LENGTH: 6.1 CM
BH CV XLRA - RV MID: 2.6 CM
BH CV XLRA - TDI S': 15.2 CM/SEC
LEFT ATRIUM VOLUME INDEX: 25 ML/M2
SINUS: 2.37 CM
STJ: 2.16 CM

## 2024-01-31 PROCEDURE — 93306 TTE W/DOPPLER COMPLETE: CPT

## 2024-03-19 ENCOUNTER — OFFICE VISIT (OUTPATIENT)
Dept: INTERNAL MEDICINE | Facility: CLINIC | Age: 74
End: 2024-03-19
Payer: MEDICARE

## 2024-03-19 VITALS
HEART RATE: 66 BPM | DIASTOLIC BLOOD PRESSURE: 82 MMHG | WEIGHT: 147.4 LBS | TEMPERATURE: 97.1 F | HEIGHT: 60 IN | BODY MASS INDEX: 28.94 KG/M2 | OXYGEN SATURATION: 96 % | SYSTOLIC BLOOD PRESSURE: 128 MMHG

## 2024-03-19 DIAGNOSIS — M79.89 LEG SWELLING: ICD-10-CM

## 2024-03-19 DIAGNOSIS — E78.5 HYPERLIPIDEMIA, UNSPECIFIED HYPERLIPIDEMIA TYPE: Primary | ICD-10-CM

## 2024-03-19 DIAGNOSIS — E66.3 OVERWEIGHT: ICD-10-CM

## 2024-03-19 NOTE — PROGRESS NOTES
"Chief Complaint  Hyperlipidemia and Leg Swelling    Subjective        Abhinav Fields presents to [facility] PRIMARY CARE  History of Present Illness    Ms. Fields is a 72yo F who presents to clinic today for follow up of HLD and leg swelling    Since last visit she has started on statin and is tolerating well.     Her leg swelling has also improved somewhat with recent lifestyle changes. She is drinking less liquid and and is joining pilates class.She is also planning to count calories in better effort to lose weight         Review of Systems   Respiratory:  Negative for shortness of breath.    Cardiovascular:  Positive for leg swelling. Negative for chest pain.        Objective   Vital Signs:  /82   Pulse 66   Temp 97.1 °F (36.2 °C) (Temporal)   Ht 152.4 cm (60\")   Wt 66.9 kg (147 lb 6.4 oz)   SpO2 96%   BMI 28.79 kg/m²   Estimated body mass index is 28.79 kg/m² as calculated from the following:    Height as of this encounter: 152.4 cm (60\").    Weight as of this encounter: 66.9 kg (147 lb 6.4 oz).               Physical Exam  Constitutional:       General: She is not in acute distress.     Appearance: Normal appearance. She is not toxic-appearing.   HENT:      Head: Normocephalic and atraumatic.   Eyes:      General: No scleral icterus.     Conjunctiva/sclera: Conjunctivae normal.   Cardiovascular:      Rate and Rhythm: Normal rate and regular rhythm.      Heart sounds: Normal heart sounds.   Musculoskeletal:      Right lower leg: Edema present.      Left lower leg: Edema present.   Skin:     General: Skin is warm and dry.   Neurological:      Mental Status: She is alert and oriented to person, place, and time.   Psychiatric:         Mood and Affect: Mood normal.         Behavior: Behavior normal.        Result Review :                   Assessment and Plan   Diagnoses and all orders for this visit:    1. Hyperlipidemia, unspecified hyperlipidemia type (Primary)  -     Lipid Panel With " / Chol / HDL Ratio    2. Overweight    3. Leg swelling      Will repeat lipid panel today since recent initiation of statin.    Her leg swelling has improved with lifestyle changes, so recommended continuing those. Echo obtained since last visit showed no WMA and normal EF    Expresses concern regarding weight and discussed options for treatment. She is trying to exercise more and counseled on healthy dietary habits as well as limiting alcohol intake which will assist with this.     Will have her follow up in 3 months to discuss weight and I expressed to her if she is having persistent or worsening dyspnea with exertion I would consider further evaluating with stress test to rule out any ASCVD          Follow Up   Return in about 3 months (around 6/19/2024).  Patient was given instructions and counseling regarding her condition or for health maintenance advice. Please see specific information pulled into the AVS if appropriate.

## 2024-03-21 LAB
CHOLEST SERPL-MCNC: 163 MG/DL (ref 0–200)
CHOLEST/HDLC SERPL: 2.59 {RATIO}
HDLC SERPL-MCNC: 63 MG/DL (ref 40–60)
LDLC SERPL CALC-MCNC: 88 MG/DL (ref 0–100)
TRIGL SERPL-MCNC: 59 MG/DL (ref 0–150)
VLDLC SERPL CALC-MCNC: 12 MG/DL (ref 5–40)

## 2024-04-16 DIAGNOSIS — K42.9 UMBILICAL HERNIA WITHOUT OBSTRUCTION AND WITHOUT GANGRENE: Primary | ICD-10-CM

## 2024-04-26 ENCOUNTER — HOSPITAL ENCOUNTER (OUTPATIENT)
Dept: ULTRASOUND IMAGING | Facility: HOSPITAL | Age: 74
Discharge: HOME | End: 2024-04-26
Payer: MEDICARE

## 2024-04-26 DIAGNOSIS — K42.9 UMBILICAL HERNIA WITHOUT OBSTRUCTION AND WITHOUT GANGRENE: ICD-10-CM

## 2024-04-26 PROCEDURE — 76705 ECHO EXAM OF ABDOMEN: CPT

## 2024-05-10 DIAGNOSIS — K43.9 VENTRAL HERNIA WITHOUT OBSTRUCTION OR GANGRENE: Primary | ICD-10-CM

## 2024-05-28 ENCOUNTER — OFFICE VISIT (OUTPATIENT)
Dept: SURGERY | Facility: CLINIC | Age: 74
End: 2024-05-28
Payer: MEDICARE

## 2024-05-28 VITALS
DIASTOLIC BLOOD PRESSURE: 82 MMHG | BODY MASS INDEX: 28.66 KG/M2 | WEIGHT: 146 LBS | HEIGHT: 60 IN | SYSTOLIC BLOOD PRESSURE: 124 MMHG

## 2024-05-28 DIAGNOSIS — K43.9 EPIGASTRIC HERNIA: Primary | ICD-10-CM

## 2024-05-28 PROCEDURE — 3074F SYST BP LT 130 MM HG: CPT | Performed by: SURGERY

## 2024-05-28 PROCEDURE — 99203 OFFICE O/P NEW LOW 30 MIN: CPT | Performed by: SURGERY

## 2024-05-28 PROCEDURE — 1160F RVW MEDS BY RX/DR IN RCRD: CPT | Performed by: SURGERY

## 2024-05-28 PROCEDURE — 1159F MED LIST DOCD IN RCRD: CPT | Performed by: SURGERY

## 2024-05-28 PROCEDURE — 3079F DIAST BP 80-89 MM HG: CPT | Performed by: SURGERY

## 2024-05-28 RX ORDER — HYDROCHLOROTHIAZIDE 12.5 MG/1
1 CAPSULE, GELATIN COATED ORAL DAILY
COMMUNITY

## 2024-05-28 RX ORDER — ACYCLOVIR 400 MG/1
TABLET ORAL
COMMUNITY
Start: 2024-05-14

## 2024-05-28 RX ORDER — SODIUM CHLORIDE 9 MG/ML
40 INJECTION, SOLUTION INTRAVENOUS AS NEEDED
OUTPATIENT
Start: 2024-05-28

## 2024-05-28 RX ORDER — SODIUM CHLORIDE 0.9 % (FLUSH) 0.9 %
10 SYRINGE (ML) INJECTION EVERY 12 HOURS SCHEDULED
OUTPATIENT
Start: 2024-05-28

## 2024-05-28 RX ORDER — SODIUM CHLORIDE 0.9 % (FLUSH) 0.9 %
10 SYRINGE (ML) INJECTION AS NEEDED
OUTPATIENT
Start: 2024-05-28

## 2024-05-28 NOTE — PROGRESS NOTES
"CC: Abdominal wall hernia     HPI: Patient is a very pleasant 73-year-old female that is here today for evaluation of abdominal wall hernia.  The patient states she has noticed a bulge approximately 6 months ago.  She was not having any pain over the area but slight discomfort.  She noted the bulge was getting larger.  She saw her primary care physician the recommend the patient undergo ultrasound of the abdominal wall.  She was found to have a small hernia.  She is here today for evaluation.  She reports no prior history of abdominal wall surgery     PMH: Hypertension, hyperlipidemia, osteopenia     PSH:   -Nasal surgery, cosmetic surgery, colonoscopy, breast biopsy, hysterectomy    MEDS: Acyclovir, Lasix, microcyte, losartan, magnesium, melatonin,     ALL: Amlodipine and penicillin, penicillin allergy supposedly as a child    FH and SH: Family history is noncontributory to current issue.  Drinks alcohol occasionally.  No drugs or smoking     ROS:   Constitutional: denies any weight changes, fatigue or weakness.  HEENT: Denies hearing loss and rhinorrhea  Cardiovascular: denies chest pain, palpitations, edemas.  Respiratory: denies cough, sputum, SOB.  Gastrointestinal: denies N&V, abd pain, diarrhea, constipation.  Genitourinary: denies dysuria, frequency.  Endocrine: denies cold intolerance, lethargy and flushing.  Hem: denies excessive bruising and postop bleeding.  Musculoskeletal: denies weakness, joint swelling, pain or stiffness.  Neuro: denies seizures, CVA, paresthesia, or peripheral neuropathy.   Skin: denies change in nevi, rashes, masses.     PE:   Vitals:    05/28/24 1408   BP: 124/82   BP Location: Left arm   Patient Position: Sitting   Weight: 66.2 kg (146 lb)   Height: 152.4 cm (60\")     Body mass index is 28.51 kg/m².   Alert and oriented ×3, no acute distress.  Head is normocephalic and atraumatic.  Neck is supple  Breathing comfortable  Abdomen is soft and nontender, is nondistended, bowel sounds " are positive.  There is no rebound or guarding is and there is no peritoneal signs.  There is a small and is reducible epigastric hernia approximately 3 cm above the umbilicus.  No clubbing cyanosis or edema in lower or upper extremities     Diagnostic studies:   Ultrasound abdominal wall 4/26/2024:   FINDINGS: Corresponding to the palpable abnormality is a small ventral  hernia. The abdominal wall defect is approximately 9-10 mm. Mesenteric  fat projects through the defect into the subcutaneous layer, this  measuring 3.8 x 1.3 cm. No fluid or edema seen. The remainder is  unremarkable.     Assessment and plan    The patient is a very pleasant 73-year-old female with small and easily reducible epigastric hernia.  Discussed with patient about treatment options that would include open versus robotic assisted laparoscopic hernia repair.  Because of the size I recommend that she undergoes open epigastric hernia repair with mesh placement.  Risk and benefits of procedure including bleeding, infection, mesh infection, wound complications, chronic pain, hernia recurrence discussed in detail with the patient.  We discussed about the possibility of placing mesh depending on the size of the defect on the strength of the surrounding tissue.  We discussed about permanent as well as slowly reabsorbing material for mesh repair.  Discussed with her that in general we have to have minor issues with permanent mesh for this type of hernia repair.  She understood    Patient agreed to have open epigastric hernia repair with possible mesh placement.  Patient to call whenever ready for scheduling     Arturo Archibald MD  General, Minimally Invasive and Endoscopic Surgery  [facility]     [address], [address]  [place], [place], [address]  P: [phone]  F: [phone]

## 2024-06-17 ENCOUNTER — TELEPHONE (OUTPATIENT)
Dept: FAMILY MEDICINE CLINIC | Facility: CLINIC | Age: 74
End: 2024-06-17

## 2024-06-17 NOTE — TELEPHONE ENCOUNTER
Patient is aware of that Dr. Addison is not accept new patient. Patient has been also advised that Dr. Souza and Dr. Rivera are both accepting new patients. She has an appointment with her old PCP and she will call back and schedule an appointment with either Dr. Rivera or Dr. Souza

## 2024-06-17 NOTE — TELEPHONE ENCOUNTER
Caller: Abhinav Fields    Relationship: Self    Best call back number:     [phone] (Mobile)       Who is your current provider: DR KONG     Is your current provider offboarding? NO     Who would you like your new provider to be: DR HALL     What are your reasons for transferring care: NOT HAPPY WITH THE CARE SHE IS GETTING     Additional notes: REFERRED TO DR HALL BY A FRIEND

## 2024-07-18 ENCOUNTER — OFFICE VISIT (OUTPATIENT)
Dept: SURGERY | Facility: CLINIC | Age: 74
End: 2024-07-18
Payer: MEDICARE

## 2024-07-18 VITALS
WEIGHT: 143.8 LBS | SYSTOLIC BLOOD PRESSURE: 125 MMHG | DIASTOLIC BLOOD PRESSURE: 80 MMHG | BODY MASS INDEX: 28.23 KG/M2 | HEIGHT: 60 IN

## 2024-07-18 DIAGNOSIS — Z12.11 SCREENING FOR COLON CANCER: ICD-10-CM

## 2024-07-18 DIAGNOSIS — Z86.010 HISTORY OF COLON POLYPS: ICD-10-CM

## 2024-07-18 DIAGNOSIS — K43.9 EPIGASTRIC HERNIA: Primary | ICD-10-CM

## 2024-07-18 PROCEDURE — 99214 OFFICE O/P EST MOD 30 MIN: CPT | Performed by: SURGERY

## 2024-07-18 PROCEDURE — 3079F DIAST BP 80-89 MM HG: CPT | Performed by: SURGERY

## 2024-07-18 PROCEDURE — 1159F MED LIST DOCD IN RCRD: CPT | Performed by: SURGERY

## 2024-07-18 PROCEDURE — 1160F RVW MEDS BY RX/DR IN RCRD: CPT | Performed by: SURGERY

## 2024-07-18 PROCEDURE — 3074F SYST BP LT 130 MM HG: CPT | Performed by: SURGERY

## 2024-08-07 NOTE — PROGRESS NOTES
General Surgery  Initial Office Visit    CC: Abdominal wall hernia    HPI: The patient is a pleasant 73 y.o. year-old lady who presents today for evaluation of intermittent abdominal wall hernia she noticed about a year ago.  It is located within the epigastric region and currently incarcerated.  She has intermittent pain associated with the hernia, and has been unable to reduce the hernia contents.  She denies any overlying skin erythema, nausea, or vomiting.  She also wishes to discuss having a repeat screening colonoscopy.  She had a previous colonoscopy done by Dr. Emigdio Ramirez many years ago where a few polyps were removed.  She has no family history of colon cancer and denies any melena or hematochezia.    Past Medical History:   Hypertension  Hyperlipidemia  Borderline diabetes  Osteopenia  History of colon polyps  History of squamous cell carcinoma of the skin    Past Surgical History:   Hysterectomy  Facial cosmetic surgery  Breast biopsy  Colonoscopy    Medications:   Acyclovir 400 mg daily  Lasix 40 mg daily as needed  Hydrochlorothiazide 12.5 mg daily  Losartan 25 mg twice daily  Magnesium 400 mg as needed  Melatonin 5 mg nightly as needed    Allergies: Amlodipine, penicillin (unknown childhood reaction)    Family History: Mother with Alzheimer's disease, father with Alzheimer's disease  at age 91    Social History: , retired occupational therapist, non-smoker, social alcohol use    ROS:  Constitutional: Negative for fevers or chills  HENT: Negative for hearing loss or runny nose  Eyes: Negative for vision changes or scleral icterus  Respiratory: Negative for cough or shortness of breath  Cardiovascular: Negative for chest pain or heart palpitations  Gastrointestinal: Negative for abdominal distension, nausea, vomiting, constipation, melena, or hematochezia  Genitourinary: Negative for hematuria or dysuria  Musculoskeletal: Negative for joint swelling or gait instability  Neurologic:  Negative for tremors or seizures  Psychiatric: Negative for suicidal ideations or agitation  All other systems reviewed and negative    Physical Exam:  Height: 152 cm  Weight: 65 kg  BMI: 28.08  General: No acute distress, well-nourished & well-developed  HEAD: normocephalic, atraumatic  EYES: normal conjunctiva, sclera anicteric  EARS: grossly normal hearing  NECK: supple, no thyromegaly  CARDIOVASCULAR: regular rate and rhythm  RESPIRATORY: clear to auscultation bilaterally  GASTROINTESTINAL: soft, nontender, non-distended, incarcerated epigastric abdominal wall hernia measuring about 3 cm within the subcutaneous fat, unable to appreciate the size of the fascial defect since the incarcerated contents are nonreducible, there is no overlying skin erythema or fluctuance  MUSCULOSKELETAL: normal gait and station. No gross extremity abnormalities  PSYCHIATRIC: oriented x3, normal mood and affect    IMAGING:  ABDOMINAL ULTRASOUND (April 2024):  FINDINGS: Corresponding to the palpable abnormality is a small ventral hernia. The abdominal wall defect is approximately 9-10 mm. Mesenteric fat projects through the defect into the subcutaneous layer, this measuring 3.8 x 1.3 cm. No fluid or edema seen. The remainder is unremarkable.    ASSESSMENT & PLAN  Ms. Fields is a 73-year-old lady with an incarcerated epigastric hernia containing fat.  I reviewed her recent abdominal ultrasound which demonstrates a 1 cm fascial defect.  I would recommend scheduling her for open epigastric hernia repair (primary suture repair, no mesh will be needed).  I would also be happy to perform a screening colonoscopy at the same time.  She was counseled on the risks of both procedures which include bleeding, wound infection, possible hernia recurrence, colon perforation, and possible missed pathology within the colon.  Despite these risks, she has consented to proceed and would like to call back to schedule surgery sometime in the  fall.    Natty Petit MD  General, Robotic, and Endoscopic Surgery  [facility]    [address], [address]  [place] [address]  P: [phone]  F: [phone]

## 2024-08-13 ENCOUNTER — DOCUMENTATION (OUTPATIENT)
Dept: OCCUPATIONAL THERAPY | Facility: HOSPITAL | Age: 74
End: 2024-08-13
Payer: MEDICARE

## 2024-08-13 DIAGNOSIS — I89.0 LYMPHEDEMA OF BOTH LOWER EXTREMITIES: ICD-10-CM

## 2024-08-13 DIAGNOSIS — I89.0 LYMPHEDEMA, NOT ELSEWHERE CLASSIFIED: Primary | ICD-10-CM

## 2024-08-13 NOTE — THERAPY DISCHARGE NOTE
Outpatient Occupational Therapy/Lymphedema clinic Discharge Summary         Patient Name: Abhinav Fields  : 1950  MRN: [id number]    Today's Date: 2024      Visit Date: 2024           OP OT Discharge Summary  Date of Discharge: 24  Reason for Discharge: other (comment) (Pt. not able to attend tx.)  Outcomes Achieved: Refer to plan of care for updates on goals achieved  Discharge Destination: Home with home program  Discharge Instructions: Pt. is able to wear the bandages day/night as needed. Pt. was measured for compression stockings and velcro compression 23 with [employer] rep present. Pt. preferred to wait until  before ordering compression items.      Time Calculation:                         Latanya Augustine, OTR   2024

## 2024-08-16 ENCOUNTER — TELEPHONE (OUTPATIENT)
Dept: INTERNAL MEDICINE | Facility: CLINIC | Age: 74
End: 2024-08-16
Payer: MEDICARE

## 2024-08-16 DIAGNOSIS — Z12.31 ENCOUNTER FOR SCREENING MAMMOGRAM FOR MALIGNANT NEOPLASM OF BREAST: Primary | ICD-10-CM

## 2024-08-16 NOTE — TELEPHONE ENCOUNTER
Caller: DIVYA    Relationship: Other [facility]    Best call back number: [phone]     What orders are you requesting (i.e. lab or imaging): SCREENING MAMMOGRAM    In what timeframe would the patient need to come in: ASAP     Where will you receive your lab/imaging services: [facility]    Additional notes: PLEASE [phone]

## 2024-08-23 ENCOUNTER — PREP FOR SURGERY (OUTPATIENT)
Dept: OTHER | Facility: HOSPITAL | Age: 74
End: 2024-08-23
Payer: MEDICARE

## 2024-08-23 ENCOUNTER — TELEPHONE (OUTPATIENT)
Dept: SURGERY | Facility: CLINIC | Age: 74
End: 2024-08-23
Payer: MEDICARE

## 2024-08-23 DIAGNOSIS — K43.9 EPIGASTRIC HERNIA: Primary | ICD-10-CM

## 2024-08-23 DIAGNOSIS — Z12.11 SCREENING FOR COLON CANCER: ICD-10-CM

## 2024-08-23 DIAGNOSIS — Z86.010 HISTORY OF COLON POLYPS: ICD-10-CM

## 2024-08-23 NOTE — TELEPHONE ENCOUNTER
Pt left a message wanting to get surgery scheduled on 9-30. I'll need orders to be placed before calling the pt. Will send message to Dr Petit.

## 2024-08-27 ENCOUNTER — OFFICE VISIT (OUTPATIENT)
Dept: INTERNAL MEDICINE | Facility: CLINIC | Age: 74
End: 2024-08-27
Payer: MEDICARE

## 2024-08-27 VITALS
WEIGHT: 143.4 LBS | OXYGEN SATURATION: 94 % | HEIGHT: 60 IN | SYSTOLIC BLOOD PRESSURE: 112 MMHG | BODY MASS INDEX: 28.16 KG/M2 | TEMPERATURE: 97.5 F | DIASTOLIC BLOOD PRESSURE: 68 MMHG | HEART RATE: 75 BPM

## 2024-08-27 DIAGNOSIS — H66.90 ACUTE OTITIS MEDIA, UNSPECIFIED OTITIS MEDIA TYPE: Primary | ICD-10-CM

## 2024-08-27 PROBLEM — K43.9 EPIGASTRIC HERNIA: Status: ACTIVE | Noted: 2024-08-23

## 2024-08-27 PROBLEM — Z86.010 HISTORY OF COLON POLYPS: Status: ACTIVE | Noted: 2024-08-23

## 2024-08-27 PROBLEM — Z12.11 SCREENING FOR COLON CANCER: Status: ACTIVE | Noted: 2024-08-23

## 2024-08-27 PROBLEM — Z86.0100 HISTORY OF COLON POLYPS: Status: ACTIVE | Noted: 2024-08-23

## 2024-08-27 PROCEDURE — 99213 OFFICE O/P EST LOW 20 MIN: CPT | Performed by: STUDENT IN AN ORGANIZED HEALTH CARE EDUCATION/TRAINING PROGRAM

## 2024-08-27 PROCEDURE — 1126F AMNT PAIN NOTED NONE PRSNT: CPT | Performed by: STUDENT IN AN ORGANIZED HEALTH CARE EDUCATION/TRAINING PROGRAM

## 2024-08-27 PROCEDURE — 3074F SYST BP LT 130 MM HG: CPT | Performed by: STUDENT IN AN ORGANIZED HEALTH CARE EDUCATION/TRAINING PROGRAM

## 2024-08-27 PROCEDURE — 3078F DIAST BP <80 MM HG: CPT | Performed by: STUDENT IN AN ORGANIZED HEALTH CARE EDUCATION/TRAINING PROGRAM

## 2024-08-27 PROCEDURE — 1160F RVW MEDS BY RX/DR IN RCRD: CPT | Performed by: STUDENT IN AN ORGANIZED HEALTH CARE EDUCATION/TRAINING PROGRAM

## 2024-08-27 PROCEDURE — 1159F MED LIST DOCD IN RCRD: CPT | Performed by: STUDENT IN AN ORGANIZED HEALTH CARE EDUCATION/TRAINING PROGRAM

## 2024-08-27 PROCEDURE — G2211 COMPLEX E/M VISIT ADD ON: HCPCS | Performed by: STUDENT IN AN ORGANIZED HEALTH CARE EDUCATION/TRAINING PROGRAM

## 2024-08-27 RX ORDER — DOXYCYCLINE HYCLATE 100 MG
100 TABLET ORAL 2 TIMES DAILY
Qty: 14 TABLET | Refills: 0 | Status: SHIPPED | OUTPATIENT
Start: 2024-08-27 | End: 2024-09-03

## 2024-08-27 NOTE — PROGRESS NOTES
"Chief Complaint  Earache    Subjective        Abhinav Fields presents to [facility] PRIMARY CARE  History of Present Illness    Presents to clinic today with complaints of ear pain. States it is in her left ear, and has episodes of sharp stabbing pain that are random and occur multiples times a day. She states she feels a pulsatile nature to it, but no tinnitus, hearing deficits, or headaches. Denies any fevers, ear discharge    Objective   Vital Signs:  /68   Pulse 75   Temp 97.5 °F (36.4 °C) (Temporal)   Ht 152.4 cm (60\")   Wt 65 kg (143 lb 6.4 oz)   SpO2 94%   BMI 28.01 kg/m²   Estimated body mass index is 28.01 kg/m² as calculated from the following:    Height as of this encounter: 152.4 cm (60\").    Weight as of this encounter: 65 kg (143 lb 6.4 oz).            Physical Exam  Constitutional:       General: She is not in acute distress.     Appearance: Normal appearance. She is not ill-appearing or toxic-appearing.   HENT:      Head: Normocephalic and atraumatic.      Right Ear: External ear normal. A middle ear effusion is present. Tympanic membrane is not injected, erythematous or bulging.      Left Ear: External ear normal. No decreased hearing noted. Tenderness present. No drainage. No mastoid tenderness. Tympanic membrane is erythematous and bulging. Tympanic membrane is not perforated.      Mouth/Throat:      Pharynx: Oropharynx is clear. Uvula midline. No pharyngeal swelling, posterior oropharyngeal erythema or uvula swelling.      Tonsils: No tonsillar exudate or tonsillar abscesses.   Neurological:      Mental Status: She is alert.        Result Review :                Assessment and Plan   Diagnoses and all orders for this visit:    1. Acute otitis media, unspecified otitis media type (Primary)  -     doxycycline (VIBRAMYICN) 100 MG tablet; Take 1 tablet by mouth 2 (Two) Times a Day for 7 days.  Dispense: 14 tablet; Refill: 0      Exam notable for erythematous and bulging TM " with impacted cerumen, appearing consistent with AOM. Given PCN allergy will prescribe doxy.    Advised that if symptoms worsen with fevers, worsening pain, we may need to obtain imaging to rule out malignant otitis externa however very low suspicion given pain is more mild and no canal erythema     RTC in about 15 weeks for AWV or sooner prn        Follow Up   Return in about 15 weeks (around 12/10/2024) for Medicare Wellness.  Patient was given instructions and counseling regarding her condition or for health maintenance advice. Please see specific information pulled into the AVS if appropriate.

## 2024-09-04 ENCOUNTER — EXTERNAL PBMM DATA (OUTPATIENT)
Dept: PHARMACY | Facility: OTHER | Age: 74
End: 2024-09-04
Payer: MEDICARE

## 2024-09-20 ENCOUNTER — PRE-ADMISSION TESTING (OUTPATIENT)
Dept: PREADMISSION TESTING | Facility: HOSPITAL | Age: 74
End: 2024-09-20
Payer: MEDICARE

## 2024-09-20 VITALS
SYSTOLIC BLOOD PRESSURE: 143 MMHG | HEIGHT: 58 IN | BODY MASS INDEX: 29.41 KG/M2 | TEMPERATURE: 97.8 F | OXYGEN SATURATION: 96 % | DIASTOLIC BLOOD PRESSURE: 85 MMHG | HEART RATE: 60 BPM | WEIGHT: 140.1 LBS | RESPIRATION RATE: 16 BRPM

## 2024-09-20 DIAGNOSIS — Z86.010 HISTORY OF COLON POLYPS: ICD-10-CM

## 2024-09-20 DIAGNOSIS — Z12.11 SCREENING FOR COLON CANCER: ICD-10-CM

## 2024-09-20 DIAGNOSIS — K43.9 EPIGASTRIC HERNIA: ICD-10-CM

## 2024-09-20 LAB
ANION GAP SERPL CALCULATED.3IONS-SCNC: 7 MMOL/L (ref 5–15)
BUN SERPL-MCNC: 22 MG/DL (ref 8–23)
BUN/CREAT SERPL: 28.2 (ref 7–25)
CALCIUM SPEC-SCNC: 9.3 MG/DL (ref 8.6–10.5)
CHLORIDE SERPL-SCNC: 104 MMOL/L (ref 98–107)
CO2 SERPL-SCNC: 25 MMOL/L (ref 22–29)
CREAT SERPL-MCNC: 0.78 MG/DL (ref 0.57–1)
DEPRECATED RDW RBC AUTO: 43.2 FL (ref 37–54)
EGFRCR SERPLBLD CKD-EPI 2021: 80.3 ML/MIN/1.73
ERYTHROCYTE [DISTWIDTH] IN BLOOD BY AUTOMATED COUNT: 13.1 % (ref 12.3–15.4)
GLUCOSE SERPL-MCNC: 89 MG/DL (ref 65–99)
HCT VFR BLD AUTO: 43.1 % (ref 34–46.6)
HGB BLD-MCNC: 13.5 G/DL (ref 12–15.9)
MCH RBC QN AUTO: 27.8 PG (ref 26.6–33)
MCHC RBC AUTO-ENTMCNC: 31.3 G/DL (ref 31.5–35.7)
MCV RBC AUTO: 88.9 FL (ref 79–97)
PLATELET # BLD AUTO: 287 10*3/MM3 (ref 140–450)
PMV BLD AUTO: 9.3 FL (ref 6–12)
POTASSIUM SERPL-SCNC: 4.3 MMOL/L (ref 3.5–5.2)
QT INTERVAL: 469 MS
QTC INTERVAL: 453 MS
RBC # BLD AUTO: 4.85 10*6/MM3 (ref 3.77–5.28)
SODIUM SERPL-SCNC: 136 MMOL/L (ref 136–145)
WBC NRBC COR # BLD AUTO: 7.01 10*3/MM3 (ref 3.4–10.8)

## 2024-09-20 PROCEDURE — 36415 COLL VENOUS BLD VENIPUNCTURE: CPT

## 2024-09-20 PROCEDURE — 93005 ELECTROCARDIOGRAM TRACING: CPT

## 2024-09-20 PROCEDURE — 85027 COMPLETE CBC AUTOMATED: CPT

## 2024-09-20 PROCEDURE — 93010 ELECTROCARDIOGRAM REPORT: CPT | Performed by: INTERNAL MEDICINE

## 2024-09-20 PROCEDURE — 80048 BASIC METABOLIC PNL TOTAL CA: CPT

## 2024-09-20 RX ORDER — ATORVASTATIN CALCIUM 20 MG/1
20 TABLET, FILM COATED ORAL NIGHTLY
COMMUNITY

## 2024-09-27 ENCOUNTER — ANESTHESIA (OUTPATIENT)
Dept: PERIOP | Facility: HOSPITAL | Age: 74
End: 2024-09-27
Payer: MEDICARE

## 2024-09-27 ENCOUNTER — ANESTHESIA EVENT (OUTPATIENT)
Dept: PERIOP | Facility: HOSPITAL | Age: 74
End: 2024-09-27
Payer: MEDICARE

## 2024-09-27 ENCOUNTER — HOSPITAL ENCOUNTER (OUTPATIENT)
Facility: HOSPITAL | Age: 74
Setting detail: HOSPITAL OUTPATIENT SURGERY
Discharge: HOME | End: 2024-09-27
Attending: SURGERY | Admitting: SURGERY
Payer: MEDICARE

## 2024-09-27 VITALS
OXYGEN SATURATION: 91 % | TEMPERATURE: 97.9 F | DIASTOLIC BLOOD PRESSURE: 80 MMHG | SYSTOLIC BLOOD PRESSURE: 154 MMHG | RESPIRATION RATE: 18 BRPM | HEART RATE: 54 BPM

## 2024-09-27 DIAGNOSIS — Z12.11 SCREENING FOR COLON CANCER: ICD-10-CM

## 2024-09-27 DIAGNOSIS — Z86.010 HISTORY OF COLON POLYPS: ICD-10-CM

## 2024-09-27 DIAGNOSIS — Z86.0100 HISTORY OF COLON POLYPS: ICD-10-CM

## 2024-09-27 DIAGNOSIS — K43.9 EPIGASTRIC HERNIA: Primary | ICD-10-CM

## 2024-09-27 PROCEDURE — 49591 RPR AA HRN 1ST < 3 CM RDC: CPT | Performed by: SURGERY

## 2024-09-27 PROCEDURE — 25010000002 DEXAMETHASONE SODIUM PHOSPHATE 20 MG/5ML SOLUTION: Performed by: ANESTHESIOLOGY

## 2024-09-27 PROCEDURE — 25010000002 ONDANSETRON PER 1 MG: Performed by: ANESTHESIOLOGY

## 2024-09-27 PROCEDURE — 45378 DIAGNOSTIC COLONOSCOPY: CPT | Performed by: SURGERY

## 2024-09-27 PROCEDURE — 25010000002 LABETALOL 5 MG/ML SOLUTION: Performed by: ANESTHESIOLOGY

## 2024-09-27 PROCEDURE — 25010000002 MIDAZOLAM PER 1 MG: Performed by: ANESTHESIOLOGY

## 2024-09-27 PROCEDURE — 25010000002 PROPOFOL 200 MG/20ML EMULSION: Performed by: ANESTHESIOLOGY

## 2024-09-27 PROCEDURE — 25010000002 HYDROMORPHONE PER 4 MG: Performed by: ANESTHESIOLOGY

## 2024-09-27 PROCEDURE — 25810000003 LACTATED RINGERS PER 1000 ML: Performed by: ANESTHESIOLOGY

## 2024-09-27 PROCEDURE — 25010000002 SUGAMMADEX 200 MG/2ML SOLUTION: Performed by: ANESTHESIOLOGY

## 2024-09-27 PROCEDURE — 25010000002 FENTANYL CITRATE (PF) 50 MCG/ML SOLUTION: Performed by: ANESTHESIOLOGY

## 2024-09-27 PROCEDURE — 25010000002 CEFAZOLIN PER 500 MG: Performed by: SURGERY

## 2024-09-27 PROCEDURE — 25010000002 KETOROLAC TROMETHAMINE PER 15 MG: Performed by: ANESTHESIOLOGY

## 2024-09-27 PROCEDURE — S0260 H&P FOR SURGERY: HCPCS | Performed by: SURGERY

## 2024-09-27 RX ORDER — PROMETHAZINE HYDROCHLORIDE 25 MG/1
25 TABLET ORAL ONCE AS NEEDED
Status: DISCONTINUED | OUTPATIENT
Start: 2024-09-27 | End: 2024-09-27 | Stop reason: HOSPADM

## 2024-09-27 RX ORDER — PROPOFOL 10 MG/ML
INJECTION, EMULSION INTRAVENOUS AS NEEDED
Status: DISCONTINUED | OUTPATIENT
Start: 2024-09-27 | End: 2024-09-27 | Stop reason: SURG

## 2024-09-27 RX ORDER — EPHEDRINE SULFATE 50 MG/ML
5 INJECTION, SOLUTION INTRAVENOUS ONCE AS NEEDED
Status: DISCONTINUED | OUTPATIENT
Start: 2024-09-27 | End: 2024-09-27 | Stop reason: HOSPADM

## 2024-09-27 RX ORDER — MIDAZOLAM HYDROCHLORIDE 1 MG/ML
0.5 INJECTION INTRAMUSCULAR; INTRAVENOUS
Status: COMPLETED | OUTPATIENT
Start: 2024-09-27 | End: 2024-09-27

## 2024-09-27 RX ORDER — ONDANSETRON 2 MG/ML
INJECTION INTRAMUSCULAR; INTRAVENOUS AS NEEDED
Status: DISCONTINUED | OUTPATIENT
Start: 2024-09-27 | End: 2024-09-27 | Stop reason: SURG

## 2024-09-27 RX ORDER — DEXAMETHASONE SODIUM PHOSPHATE 4 MG/ML
INJECTION, SOLUTION INTRA-ARTICULAR; INTRALESIONAL; INTRAMUSCULAR; INTRAVENOUS; SOFT TISSUE AS NEEDED
Status: DISCONTINUED | OUTPATIENT
Start: 2024-09-27 | End: 2024-09-27 | Stop reason: SURG

## 2024-09-27 RX ORDER — SODIUM CHLORIDE 0.9 % (FLUSH) 0.9 %
3-10 SYRINGE (ML) INJECTION AS NEEDED
Status: DISCONTINUED | OUTPATIENT
Start: 2024-09-27 | End: 2024-09-27 | Stop reason: HOSPADM

## 2024-09-27 RX ORDER — SODIUM CHLORIDE 0.9 % (FLUSH) 0.9 %
3 SYRINGE (ML) INJECTION EVERY 12 HOURS SCHEDULED
Status: DISCONTINUED | OUTPATIENT
Start: 2024-09-27 | End: 2024-09-27 | Stop reason: HOSPADM

## 2024-09-27 RX ORDER — HYDROMORPHONE HYDROCHLORIDE 1 MG/ML
0.25 INJECTION, SOLUTION INTRAMUSCULAR; INTRAVENOUS; SUBCUTANEOUS
Status: DISCONTINUED | OUTPATIENT
Start: 2024-09-27 | End: 2024-09-27 | Stop reason: HOSPADM

## 2024-09-27 RX ORDER — NALOXONE HCL 0.4 MG/ML
0.2 VIAL (ML) INJECTION AS NEEDED
Status: DISCONTINUED | OUTPATIENT
Start: 2024-09-27 | End: 2024-09-27 | Stop reason: HOSPADM

## 2024-09-27 RX ORDER — ROCURONIUM BROMIDE 10 MG/ML
INJECTION, SOLUTION INTRAVENOUS AS NEEDED
Status: DISCONTINUED | OUTPATIENT
Start: 2024-09-27 | End: 2024-09-27 | Stop reason: SURG

## 2024-09-27 RX ORDER — KETOROLAC TROMETHAMINE 30 MG/ML
INJECTION, SOLUTION INTRAMUSCULAR; INTRAVENOUS AS NEEDED
Status: DISCONTINUED | OUTPATIENT
Start: 2024-09-27 | End: 2024-09-27 | Stop reason: SURG

## 2024-09-27 RX ORDER — BUPIVACAINE HYDROCHLORIDE AND EPINEPHRINE 5; 5 MG/ML; UG/ML
INJECTION, SOLUTION EPIDURAL; INTRACAUDAL; PERINEURAL AS NEEDED
Status: DISCONTINUED | OUTPATIENT
Start: 2024-09-27 | End: 2024-09-27 | Stop reason: HOSPADM

## 2024-09-27 RX ORDER — LABETALOL HYDROCHLORIDE 5 MG/ML
INJECTION, SOLUTION INTRAVENOUS AS NEEDED
Status: DISCONTINUED | OUTPATIENT
Start: 2024-09-27 | End: 2024-09-27 | Stop reason: SURG

## 2024-09-27 RX ORDER — FENTANYL CITRATE 50 UG/ML
25 INJECTION, SOLUTION INTRAMUSCULAR; INTRAVENOUS
Status: DISCONTINUED | OUTPATIENT
Start: 2024-09-27 | End: 2024-09-27 | Stop reason: HOSPADM

## 2024-09-27 RX ORDER — LABETALOL HYDROCHLORIDE 5 MG/ML
5 INJECTION, SOLUTION INTRAVENOUS
Status: DISCONTINUED | OUTPATIENT
Start: 2024-09-27 | End: 2024-09-27 | Stop reason: HOSPADM

## 2024-09-27 RX ORDER — FAMOTIDINE 10 MG/ML
20 INJECTION, SOLUTION INTRAVENOUS ONCE
Status: COMPLETED | OUTPATIENT
Start: 2024-09-27 | End: 2024-09-27

## 2024-09-27 RX ORDER — HYDROCODONE BITARTRATE AND ACETAMINOPHEN 5; 325 MG/1; MG/1
1 TABLET ORAL ONCE AS NEEDED
Status: DISCONTINUED | OUTPATIENT
Start: 2024-09-27 | End: 2024-09-27 | Stop reason: HOSPADM

## 2024-09-27 RX ORDER — LIDOCAINE HYDROCHLORIDE 10 MG/ML
0.5 INJECTION, SOLUTION INFILTRATION; PERINEURAL ONCE AS NEEDED
Status: DISCONTINUED | OUTPATIENT
Start: 2024-09-27 | End: 2024-09-27 | Stop reason: HOSPADM

## 2024-09-27 RX ORDER — OXYCODONE AND ACETAMINOPHEN 5; 325 MG/1; MG/1
1 TABLET ORAL ONCE AS NEEDED
Status: COMPLETED | OUTPATIENT
Start: 2024-09-27 | End: 2024-09-27

## 2024-09-27 RX ORDER — DROPERIDOL 2.5 MG/ML
0.62 INJECTION, SOLUTION INTRAMUSCULAR; INTRAVENOUS
Status: DISCONTINUED | OUTPATIENT
Start: 2024-09-27 | End: 2024-09-27 | Stop reason: HOSPADM

## 2024-09-27 RX ORDER — FENTANYL CITRATE 50 UG/ML
50 INJECTION, SOLUTION INTRAMUSCULAR; INTRAVENOUS ONCE AS NEEDED
Status: DISCONTINUED | OUTPATIENT
Start: 2024-09-27 | End: 2024-09-27 | Stop reason: HOSPADM

## 2024-09-27 RX ORDER — SODIUM CHLORIDE, SODIUM LACTATE, POTASSIUM CHLORIDE, CALCIUM CHLORIDE 600; 310; 30; 20 MG/100ML; MG/100ML; MG/100ML; MG/100ML
9 INJECTION, SOLUTION INTRAVENOUS CONTINUOUS
Status: DISCONTINUED | OUTPATIENT
Start: 2024-09-27 | End: 2024-09-27 | Stop reason: HOSPADM

## 2024-09-27 RX ORDER — MAGNESIUM HYDROXIDE 1200 MG/15ML
LIQUID ORAL AS NEEDED
Status: DISCONTINUED | OUTPATIENT
Start: 2024-09-27 | End: 2024-09-27 | Stop reason: HOSPADM

## 2024-09-27 RX ORDER — ONDANSETRON 2 MG/ML
4 INJECTION INTRAMUSCULAR; INTRAVENOUS ONCE AS NEEDED
Status: DISCONTINUED | OUTPATIENT
Start: 2024-09-27 | End: 2024-09-27 | Stop reason: HOSPADM

## 2024-09-27 RX ORDER — HYDROCODONE BITARTRATE AND ACETAMINOPHEN 7.5; 325 MG/1; MG/1
1 TABLET ORAL EVERY 4 HOURS PRN
Status: DISCONTINUED | OUTPATIENT
Start: 2024-09-27 | End: 2024-09-27 | Stop reason: HOSPADM

## 2024-09-27 RX ORDER — IPRATROPIUM BROMIDE AND ALBUTEROL SULFATE 2.5; .5 MG/3ML; MG/3ML
3 SOLUTION RESPIRATORY (INHALATION) ONCE AS NEEDED
Status: DISCONTINUED | OUTPATIENT
Start: 2024-09-27 | End: 2024-09-27 | Stop reason: HOSPADM

## 2024-09-27 RX ORDER — DIPHENHYDRAMINE HYDROCHLORIDE 50 MG/ML
12.5 INJECTION INTRAMUSCULAR; INTRAVENOUS
Status: DISCONTINUED | OUTPATIENT
Start: 2024-09-27 | End: 2024-09-27 | Stop reason: HOSPADM

## 2024-09-27 RX ORDER — LIDOCAINE HYDROCHLORIDE 20 MG/ML
INJECTION, SOLUTION INFILTRATION; PERINEURAL AS NEEDED
Status: DISCONTINUED | OUTPATIENT
Start: 2024-09-27 | End: 2024-09-27 | Stop reason: SURG

## 2024-09-27 RX ORDER — FENTANYL CITRATE 50 UG/ML
INJECTION, SOLUTION INTRAMUSCULAR; INTRAVENOUS AS NEEDED
Status: DISCONTINUED | OUTPATIENT
Start: 2024-09-27 | End: 2024-09-27 | Stop reason: SURG

## 2024-09-27 RX ORDER — HYDRALAZINE HYDROCHLORIDE 20 MG/ML
5 INJECTION INTRAMUSCULAR; INTRAVENOUS
Status: DISCONTINUED | OUTPATIENT
Start: 2024-09-27 | End: 2024-09-27 | Stop reason: HOSPADM

## 2024-09-27 RX ORDER — FLUMAZENIL 0.1 MG/ML
0.2 INJECTION INTRAVENOUS AS NEEDED
Status: DISCONTINUED | OUTPATIENT
Start: 2024-09-27 | End: 2024-09-27 | Stop reason: HOSPADM

## 2024-09-27 RX ORDER — OXYCODONE AND ACETAMINOPHEN 5; 325 MG/1; MG/1
1 TABLET ORAL EVERY 4 HOURS PRN
Qty: 15 TABLET | Refills: 0 | Status: SHIPPED | OUTPATIENT
Start: 2024-09-27

## 2024-09-27 RX ORDER — PROMETHAZINE HYDROCHLORIDE 25 MG/1
25 SUPPOSITORY RECTAL ONCE AS NEEDED
Status: DISCONTINUED | OUTPATIENT
Start: 2024-09-27 | End: 2024-09-27 | Stop reason: HOSPADM

## 2024-09-27 RX ADMIN — LIDOCAINE HYDROCHLORIDE 100 MG: 20 INJECTION, SOLUTION INFILTRATION; PERINEURAL at 12:16

## 2024-09-27 RX ADMIN — LABETALOL HYDROCHLORIDE 5 MG: 5 INJECTION, SOLUTION INTRAVENOUS at 13:05

## 2024-09-27 RX ADMIN — FAMOTIDINE 20 MG: 10 INJECTION INTRAVENOUS at 11:08

## 2024-09-27 RX ADMIN — ONDANSETRON 4 MG: 2 INJECTION INTRAMUSCULAR; INTRAVENOUS at 12:46

## 2024-09-27 RX ADMIN — MIDAZOLAM 0.5 MG: 1 INJECTION INTRAMUSCULAR; INTRAVENOUS at 11:22

## 2024-09-27 RX ADMIN — ROCURONIUM 40 MG: 50 INJECTION, SOLUTION INTRAVENOUS at 12:16

## 2024-09-27 RX ADMIN — SUGAMMADEX 200 MG: 100 INJECTION, SOLUTION INTRAVENOUS at 12:54

## 2024-09-27 RX ADMIN — DEXAMETHASONE SODIUM PHOSPHATE 4 MG: 4 INJECTION, SOLUTION INTRAMUSCULAR; INTRAVENOUS at 12:28

## 2024-09-27 RX ADMIN — FENTANYL CITRATE 50 MCG: 50 INJECTION, SOLUTION INTRAMUSCULAR; INTRAVENOUS at 12:25

## 2024-09-27 RX ADMIN — OXYCODONE HYDROCHLORIDE AND ACETAMINOPHEN 1 TABLET: 5; 325 TABLET ORAL at 14:11

## 2024-09-27 RX ADMIN — SODIUM CHLORIDE, POTASSIUM CHLORIDE, SODIUM LACTATE AND CALCIUM CHLORIDE: 600; 310; 30; 20 INJECTION, SOLUTION INTRAVENOUS at 13:00

## 2024-09-27 RX ADMIN — MIDAZOLAM 0.5 MG: 1 INJECTION INTRAMUSCULAR; INTRAVENOUS at 11:53

## 2024-09-27 RX ADMIN — HYDROMORPHONE HYDROCHLORIDE 0.25 MG: 1 INJECTION, SOLUTION INTRAMUSCULAR; INTRAVENOUS; SUBCUTANEOUS at 13:39

## 2024-09-27 RX ADMIN — PROPOFOL 150 MG: 10 INJECTION, EMULSION INTRAVENOUS at 12:16

## 2024-09-27 RX ADMIN — KETOROLAC TROMETHAMINE 15 MG: 30 INJECTION, SOLUTION INTRAMUSCULAR at 12:46

## 2024-09-27 RX ADMIN — HYDROMORPHONE HYDROCHLORIDE 0.25 MG: 1 INJECTION, SOLUTION INTRAMUSCULAR; INTRAVENOUS; SUBCUTANEOUS at 13:34

## 2024-09-27 RX ADMIN — SODIUM CHLORIDE, POTASSIUM CHLORIDE, SODIUM LACTATE AND CALCIUM CHLORIDE 9 ML/HR: 600; 310; 30; 20 INJECTION, SOLUTION INTRAVENOUS at 11:08

## 2024-09-27 RX ADMIN — SODIUM CHLORIDE 2000 MG: 900 INJECTION INTRAVENOUS at 12:05

## 2024-09-27 NOTE — H&P
General Surgery  History & Physical    CC: Abdominal wall hernia    HPI: The patient is a pleasant 73 y.o. year-old lady who presents today for evaluation of intermittent abdominal wall hernia she noticed about a year ago.  It is located within the epigastric region and currently incarcerated.  She has intermittent pain associated with the hernia, and has been unable to reduce the hernia contents.  She denies any overlying skin erythema, nausea, or vomiting.  She also wishes to discuss having a repeat screening colonoscopy.  She had a previous colonoscopy done by Dr. Emigdio Ramirez many years ago where a few polyps were removed.  She has no family history of colon cancer and denies any melena or hematochezia.    Past Medical History:   Hypertension  Hyperlipidemia  Borderline diabetes  Osteopenia  History of colon polyps  History of squamous cell carcinoma of the skin    Past Surgical History:   Hysterectomy  Facial cosmetic surgery  Breast biopsy  Colonoscopy    Medications:   Acyclovir 400 mg daily  Lasix 40 mg daily as needed  Hydrochlorothiazide 12.5 mg daily  Losartan 25 mg twice daily  Magnesium 400 mg as needed  Melatonin 5 mg nightly as needed    Allergies: Amlodipine, penicillin (unknown childhood reaction)    Family History: Mother with Alzheimer's disease, father with Alzheimer's disease  at age 91    Social History: , retired occupational therapist, non-smoker, social alcohol use    ROS:  Constitutional: Negative for fevers or chills  HENT: Negative for hearing loss or runny nose  Eyes: Negative for vision changes or scleral icterus  Respiratory: Negative for cough or shortness of breath  Cardiovascular: Negative for chest pain or heart palpitations  Gastrointestinal: Negative for abdominal distension, nausea, vomiting, constipation, melena, or hematochezia  Genitourinary: Negative for hematuria or dysuria  Musculoskeletal: Negative for joint swelling or gait instability  Neurologic:  Negative for tremors or seizures  Psychiatric: Negative for suicidal ideations or agitation  All other systems reviewed and negative    Physical Exam:  Vitals:    09/27/24 1029   BP: 141/79   Pulse: 57   Resp: 16   Temp: 98 °F (36.7 °C)   SpO2: 98%   Height: 147 cm  Weight: 63.5 kg  BMI: 29  General: No acute distress, well-nourished & well-developed  HEAD: normocephalic, atraumatic  EYES: normal conjunctiva, sclera anicteric  EARS: grossly normal hearing  NECK: supple, no thyromegaly  CARDIOVASCULAR: regular rate and rhythm  RESPIRATORY: clear to auscultation bilaterally  GASTROINTESTINAL: soft, nontender, non-distended, incarcerated epigastric abdominal wall hernia measuring about 3 cm within the subcutaneous fat, unable to appreciate the size of the fascial defect since the incarcerated contents are nonreducible, there is no overlying skin erythema or fluctuance  MUSCULOSKELETAL: normal gait and station. No gross extremity abnormalities  PSYCHIATRIC: oriented x3, normal mood and affect    IMAGING:  ABDOMINAL ULTRASOUND (April 2024):  FINDINGS: Corresponding to the palpable abnormality is a small ventral hernia. The abdominal wall defect is approximately 9-10 mm. Mesenteric fat projects through the defect into the subcutaneous layer, this measuring 3.8 x 1.3 cm. No fluid or edema seen. The remainder is unremarkable.    ASSESSMENT & PLAN  Ms. Fields is a 73-year-old lady with an incarcerated epigastric hernia containing fat.  I would recommend proceeding with open epigastric hernia repair (primary suture repair, no mesh will be needed).  I would also be happy to perform a screening colonoscopy at the same time.  She was counseled on the risks of both procedures which include bleeding, wound infection, possible hernia recurrence, colon perforation, and possible missed pathology within the colon.  Despite these risks, she has consented to proceed.    Natty Petit MD  General, Robotic, and Endoscopic  Surgery  [facility]    [address], [address]  [place] [id number]  P: [phone]  F: [phone]

## 2024-09-27 NOTE — OP NOTE
Operative Note :  Natty Petit MD      Abhinav Fields  1950    Procedure Date: 09/27/24    Pre-op Diagnosis:  Epigastric hernia [K43.9]  Screening for colon cancer [Z12.11]  History of colon polyps [Z86.010]    Post-Operative Diagnosis:  Epigastric hernia (1.5 cm, reducible, not recurrent)  Diverticulosis  Nonbleeding grade 1 internal hemorrhoids    Procedure:   Open epigastric hernia repair (1.5 cm, reducible, nonrecurrent)  Flexible colonoscopy to the cecum    Surgeon: Natty Petit MD    Assistant: Mohit Reddy CSA (Mohit was responsible for suctioning, retracting, suturing of the surgical incision, and application of sterile dressings at the completion of the epigastric hernia repair, he was not present for the colonoscopy.)    Anesthesia:  General (general endotracheal tube)    Estimated Blood Loss: minimal    Specimens: None    Complications: None    Indications:  The patient is a 73-year-old lady who has a chronically incarcerated epigastric hernia containing omental fat.  She presents today for open epigastric hernia repair.  She had an outpatient ultrasound that showed the fascial defect to be close to 1 cm in diameter.  She is also due for repeat screening colonoscopy given her last colonoscopy revealed multiple colon polyps performed by Dr. Emigdio Ramirez.  She has been counseled on the risks of the procedure which include but are not limited to bleeding, wound infection, hernia recurrence, possible damage to structures within the abdomen such as colon or small bowel, colon perforation during colonoscopy, and possible missed pathology during the colonoscopy.  Despite these risks, she has consented to proceed.    Findings: 1.3 cm epigastric fascial defect with reducible fat    Description of procedure:  The patient was brought to the operating room and placed on the OR table in supine position.  An endotracheal tube was inserted and general anesthesia induced.  The abdominal wall was  prepped and draped in a sterile fashion and a surgical timeout completed.  The supraumbilical abdominal wall at the site of her hernia where she had been marked in the preoperative holding area was anesthetized at the skin using 0.5% Marcaine with epinephrine.  An incision was made longitudinally and the underlying subcutaneous fat divided to the level of the hernia sac which was dissected circumferentially using electrocautery.  The hernia sac was then  off of the edge of fascia and easily reduced into the abdominal cavity.  The preperitoneal space was developed circumferentially for a length of about 2 cm in all directions.  The fascial defect measured about 1.3 cm in diameter.  It was reapproximated primarily using 3 separate 0 Ethibond figure-of-eight sutures.  Hemostasis was achieved using electrocautery and the incision closed primarily using interrupted 3-0 Vicryl deep dermal sutures and running 4-0 Vicryl subcuticular suture.  Topical Exofin glue was applied.  She was then turned in the left lateral decubitus position and a digital rectal exam was performed.  This revealed no palpable abnormalities of the anus or rectum.  An adult colonoscope was then inserted through the anus and passed under direct visualization with gentle insufflation to the level of the cecum.  The cecum was identified ileocecal valve and appendiceal orifice.  The scope was then slowly withdrawn, examining all circumferential mucosal walls of ascending, transverse, descending, and sigmoid colon.  She had diverticulosis of the hepatic flexure and sigmoid colon with no evidence of bleeding or fecal impaction.  There were no colon polyps found throughout the colon or rectum.  Within the rectum the scope was retroflexed and demonstrated small nonbleeding grade 1 internal hemorrhoids.  The scope was then withdrawn and colon desufflated.  She was extubated and transferred to the recovery room in stable condition with all counts  correct per nursing.    Natty Petit MD  General, Robotic, and Endoscopic Surgery  [facility]    [address], [address]  [place] [address]  P: [phone]  F: [phone]

## 2024-09-30 DIAGNOSIS — I89.0 LYMPHEDEMA: Primary | ICD-10-CM

## 2024-09-30 NOTE — HPI: SKIN LESION
What Type Of Note Output Would You Prefer (Optional)?: Bullet Format
How Severe Is Your Skin Lesion?: mild
Has Your Skin Lesion Been Treated?: not been treated
Is This A New Presentation, Or A Follow-Up?: Skin Lesions
Patient informed/Family informed/Warm blanket/Pillow/Darkened room

## 2024-10-09 DIAGNOSIS — I10 PRIMARY HYPERTENSION: ICD-10-CM

## 2024-10-09 RX ORDER — LOSARTAN POTASSIUM 25 MG/1
25 TABLET ORAL 2 TIMES DAILY
Qty: 180 TABLET | Refills: 1 | Status: SHIPPED | OUTPATIENT
Start: 2024-10-09

## 2024-10-15 ENCOUNTER — OFFICE VISIT (OUTPATIENT)
Dept: SURGERY | Facility: CLINIC | Age: 74
End: 2024-10-15
Payer: MEDICARE

## 2024-10-15 VITALS
WEIGHT: 135.2 LBS | SYSTOLIC BLOOD PRESSURE: 142 MMHG | HEIGHT: 58 IN | DIASTOLIC BLOOD PRESSURE: 90 MMHG | BODY MASS INDEX: 28.38 KG/M2

## 2024-10-15 DIAGNOSIS — Z09 SURGICAL FOLLOWUP: Primary | ICD-10-CM

## 2024-10-15 PROCEDURE — 1160F RVW MEDS BY RX/DR IN RCRD: CPT | Performed by: SURGERY

## 2024-10-15 PROCEDURE — 1159F MED LIST DOCD IN RCRD: CPT | Performed by: SURGERY

## 2024-10-15 PROCEDURE — 3080F DIAST BP >= 90 MM HG: CPT | Performed by: SURGERY

## 2024-10-15 PROCEDURE — 99024 POSTOP FOLLOW-UP VISIT: CPT | Performed by: SURGERY

## 2024-10-15 PROCEDURE — 3077F SYST BP >= 140 MM HG: CPT | Performed by: SURGERY

## 2024-10-15 NOTE — PROGRESS NOTES
CHIEF COMPLAINT:   Chief Complaint   Patient presents with    Post-op     Open epigastric hernia repair Flexible colonoscopy to cecum 9/27/24                HISTORY OF PRESENT ILLNESS:  This is a 73 y.o. female who presents for a post-operative visit after undergoing open epigastric hernia repair and screening colonoscopy on 9/27/2024.  She has been doing well since surgery with mild swelling of the incision but no erythema or drainage.    PHYSICAL EXAM:  Lungs: Clear  Heart: RRR  ABD: Umbilical incision is healing well with slight induration of the soft tissues due to underlying scar tissue but no erythema of the skin and no fluctuance to suggest an underlying seroma  Ext: no significant edema, calves nontender    A/P:  This is a 73 y.o. female patient who is S/P open epigastric hernia repair and screening colonoscopy on 9/27/2024    She is healing well.  I emphasized the importance of avoidance of heavy lifting through November 8th, after which she can begin to reinstitute exercising and heavy lifting at her discretion.  I discussed with her the benign findings from colonoscopy.  There was no evidence for any colon polyps so I would recommend she can have a repeat screening colonoscopy in 10 years if she is still in good health.  Typically, beyond age 80 we do not recommend any further screening colonoscopies as the risks begin to outweigh the benefits.  Therefore, I doubt she will require any further colonoscopies.  She can see me back on an as-needed basis.    Natty Petit MD  General, Robotic, and Endoscopic Surgery  [facility] [place]    [address], [address]  [place] [address]  P: [phone]  F: [phone]

## 2024-10-30 DIAGNOSIS — I89.0 LYMPHEDEMA: Primary | ICD-10-CM

## 2024-10-30 RX ORDER — FUROSEMIDE 40 MG/1
40 TABLET ORAL AS NEEDED
Qty: 30 TABLET | Refills: 3 | Status: SHIPPED | OUTPATIENT
Start: 2024-10-30

## 2024-11-20 ENCOUNTER — EXTERNAL PBMM DATA (OUTPATIENT)
Dept: PHARMACY | Facility: OTHER | Age: 74
End: 2024-11-20
Payer: MEDICARE

## 2024-12-04 RX ORDER — FUROSEMIDE 40 MG/1
TABLET ORAL
Qty: 30 TABLET | Refills: 3 | Status: SHIPPED | OUTPATIENT
Start: 2024-12-04

## 2024-12-10 ENCOUNTER — OFFICE VISIT (OUTPATIENT)
Dept: INTERNAL MEDICINE | Facility: CLINIC | Age: 74
End: 2024-12-10
Payer: MEDICARE

## 2024-12-10 VITALS
WEIGHT: 133.2 LBS | DIASTOLIC BLOOD PRESSURE: 84 MMHG | BODY MASS INDEX: 27.96 KG/M2 | HEART RATE: 70 BPM | SYSTOLIC BLOOD PRESSURE: 116 MMHG | OXYGEN SATURATION: 97 % | TEMPERATURE: 97.6 F | HEIGHT: 58 IN

## 2024-12-10 DIAGNOSIS — I10 HYPERTENSION, UNSPECIFIED TYPE: ICD-10-CM

## 2024-12-10 DIAGNOSIS — Z79.899 HIGH RISK MEDICATION USE: ICD-10-CM

## 2024-12-10 DIAGNOSIS — E78.5 HYPERLIPIDEMIA, UNSPECIFIED HYPERLIPIDEMIA TYPE: ICD-10-CM

## 2024-12-10 DIAGNOSIS — Z00.00 MEDICARE ANNUAL WELLNESS VISIT, SUBSEQUENT: Primary | ICD-10-CM

## 2024-12-10 DIAGNOSIS — Z23 NEED FOR INFLUENZA VACCINATION: ICD-10-CM

## 2024-12-10 DIAGNOSIS — F90.9 ATTENTION DEFICIT HYPERACTIVITY DISORDER (ADHD), UNSPECIFIED ADHD TYPE: ICD-10-CM

## 2024-12-10 PROCEDURE — 3079F DIAST BP 80-89 MM HG: CPT | Performed by: STUDENT IN AN ORGANIZED HEALTH CARE EDUCATION/TRAINING PROGRAM

## 2024-12-10 PROCEDURE — 90662 IIV NO PRSV INCREASED AG IM: CPT | Performed by: STUDENT IN AN ORGANIZED HEALTH CARE EDUCATION/TRAINING PROGRAM

## 2024-12-10 PROCEDURE — G0439 PPPS, SUBSEQ VISIT: HCPCS | Performed by: STUDENT IN AN ORGANIZED HEALTH CARE EDUCATION/TRAINING PROGRAM

## 2024-12-10 PROCEDURE — G0008 ADMIN INFLUENZA VIRUS VAC: HCPCS | Performed by: STUDENT IN AN ORGANIZED HEALTH CARE EDUCATION/TRAINING PROGRAM

## 2024-12-10 PROCEDURE — 1126F AMNT PAIN NOTED NONE PRSNT: CPT | Performed by: STUDENT IN AN ORGANIZED HEALTH CARE EDUCATION/TRAINING PROGRAM

## 2024-12-10 PROCEDURE — 1170F FXNL STATUS ASSESSED: CPT | Performed by: STUDENT IN AN ORGANIZED HEALTH CARE EDUCATION/TRAINING PROGRAM

## 2024-12-10 PROCEDURE — 99213 OFFICE O/P EST LOW 20 MIN: CPT | Performed by: STUDENT IN AN ORGANIZED HEALTH CARE EDUCATION/TRAINING PROGRAM

## 2024-12-10 PROCEDURE — 3074F SYST BP LT 130 MM HG: CPT | Performed by: STUDENT IN AN ORGANIZED HEALTH CARE EDUCATION/TRAINING PROGRAM

## 2024-12-10 RX ORDER — DEXTROAMPHETAMINE SACCHARATE, AMPHETAMINE ASPARTATE, DEXTROAMPHETAMINE SULFATE AND AMPHETAMINE SULFATE 2.5; 2.5; 2.5; 2.5 MG/1; MG/1; MG/1; MG/1
10 TABLET ORAL DAILY
Qty: 30 TABLET | Refills: 0 | Status: SHIPPED | OUTPATIENT
Start: 2024-12-10 | End: 2024-12-10

## 2024-12-10 NOTE — PROGRESS NOTES
Subjective   The ABCs of the Annual Wellness Visit  Medicare Wellness Visit      Abhinav Fields is a 74 y.o. patient who presents for a Medicare Wellness Visit.    The following portions of the patient's history were reviewed and   updated as appropriate: allergies, current medications, past family history, past medical history, past social history, past surgical history, and problem list.    Compared to one year ago, the patient's physical   health is better.  Compared to one year ago, the patient's mental   health is better.    Recent Hospitalizations:  She was not admitted to the hospital during the last year.     Current Medical Providers:  Patient Care Team:  Luis Miguel Howard MD as PCP - General (Internal Medicine)    Outpatient Medications Prior to Visit   Medication Sig Dispense Refill    atorvastatin (LIPITOR) 20 MG tablet Take 1 tablet by mouth Every Night.      furosemide (LASIX) 40 MG tablet TAKE 1 TABLET BY MOUTH DAILY AS NEEDED FOR LEG SWELLING 30 tablet 3    losartan (COZAAR) 25 MG tablet TAKE 1 TABLET BY MOUTH TWICE A  tablet 1    Magnesium 400 MG tablet Take  by mouth As Needed.      melatonin 5 MG tablet tablet Take 1 tablet by mouth. (Patient not taking: Reported on 12/10/2024)       No facility-administered medications prior to visit.     No opioid medication identified on active medication list. I have reviewed chart for other potential  high risk medication/s and harmful drug interactions in the elderly.      Aspirin is not on active medication list.  Aspirin use is not indicated based on review of current medical condition/s. Risk of harm outweighs potential benefits.  .    Patient Active Problem List   Diagnosis    Blues    HLD (hyperlipidemia)    BP (high blood pressure)    Osteopenia    Borderline diabetes    Epigastric hernia    Screening for colon cancer    History of colon polyps     Advance Care Planning Advance Directive is not on file.  ACP discussion was held with the patient  "during this visit. Patient has an advance directive (not in EMR), copy requested.            Objective   Vitals:    12/10/24 1301   BP: 116/84   Pulse: 70   Temp: 97.6 °F (36.4 °C)   TempSrc: Temporal   SpO2: 97%   Weight: 60.4 kg (133 lb 3.2 oz)   Height: 147.3 cm (58\")       Estimated body mass index is 27.84 kg/m² as calculated from the following:    Height as of this encounter: 147.3 cm (58\").    Weight as of this encounter: 60.4 kg (133 lb 3.2 oz).            Does the patient have evidence of cognitive impairment? No                                                                                                Health  Risk Assessment    Smoking Status:  Social History     Tobacco Use   Smoking Status Former    Current packs/day: 0.00    Average packs/day: 0.5 packs/day for 20.0 years (10.0 ttl pk-yrs)    Types: Cigarettes    Start date:     Quit date:     Years since quittin.9    Passive exposure: Past   Smokeless Tobacco Never     Alcohol Consumption:  Social History     Substance and Sexual Activity   Alcohol Use Not Currently    Alcohol/week: 5.0 standard drinks of alcohol    Comment: Wine       Fall Risk Screen  STEADI Fall Risk Assessment was completed, and patient is at LOW risk for falls.Assessment completed on:12/10/2024    Depression Screening   Little interest or pleasure in doing things? Not at all   Feeling down, depressed, or hopeless? Several days   PHQ-2 Total Score 1      Health Habits and Functional and Cognitive Screenin/10/2024     1:02 PM   Functional & Cognitive Status   Do you have difficulty preparing food and eating? No   Do you have difficulty bathing yourself, getting dressed or grooming yourself? No   Do you have difficulty using the toilet? No   Do you have difficulty moving around from place to place? No   Do you have trouble with steps or getting out of a bed or a chair? No   Current Diet Well Balanced Diet   Dental Exam Up to date   Eye Exam Up to date "   Exercise (times per week) 5 times per week   Current Exercises Include Pilates;Cardiovascular Workout   Do you need help using the phone?  No   Are you deaf or do you have serious difficulty hearing?  No   Do you need help to go to places out of walking distance? No   Do you need help shopping? No   Do you need help preparing meals?  No   Do you need help with housework?  No   Do you need help with laundry? No   Do you need help taking your medications? No   Do you need help managing money? No   Do you ever drive or ride in a car without wearing a seat belt? No   Have you felt unusual stress, anger or loneliness in the last month? No   Who do you live with? Alone   If you need help, do you have trouble finding someone available to you? No   Have you been bothered in the last four weeks by sexual problems? No   Do you have difficulty concentrating, remembering or making decisions? No           Age-appropriate Screening Schedule:  Refer to the list below for future screening recommendations based on patient's age, sex and/or medical conditions. Orders for these recommended tests are listed in the plan section. The patient has been provided with a written plan.    Health Maintenance List  Health Maintenance   Topic Date Due    TDAP/TD VACCINES (1 - Tdap) Never done    ZOSTER VACCINE (2 of 3) 12/10/2015    DXA SCAN  10/15/2017    HEPATITIS C SCREENING  Never done    COVID-19 Vaccine (4 - 2024-25 season) 09/01/2024    ANNUAL WELLNESS VISIT  12/05/2024    LIPID PANEL  03/20/2025    BMI FOLLOWUP  09/30/2025    MAMMOGRAM  08/21/2026    COLORECTAL CANCER SCREENING  09/27/2034    INFLUENZA VACCINE  Completed    Pneumococcal Vaccine 65+  Completed                                                                                                                                                CMS Preventative Services Quick Reference  Risk Factors Identified During Encounter  Immunizations Discussed/Encouraged: Tdap, Shingrix,  "and COVID19  Inactivity/Sedentary: Patient was advised to exercise at least 150 minutes a week per CDC recommendations.  Dental Screening Recommended  Vision Screening Recommended    The above risks/problems have been discussed with the patient.  Pertinent information has been shared with the patient in the After Visit Summary.  An After Visit Summary and PPPS were made available to the patient.    Follow Up:   Next Medicare Wellness visit to be scheduled in 1 year.         Additional E&M Note during same encounter follows:  Patient has additional, significant, and separately identifiable condition(s)/problem(s) that require work above and beyond the Medicare Wellness Visit     Chief Complaint  No chief complaint on file.    Subjective   HPI  Abhinav is also being seen today for additional medical problem/s.    She is interested in possibly going back on Adderall for ADHD, as she is having difficulty with her focus.                Objective   Vital Signs:  /84   Pulse 70   Temp 97.6 °F (36.4 °C) (Temporal)   Ht 147.3 cm (58\")   Wt 60.4 kg (133 lb 3.2 oz)   SpO2 97%   BMI 27.84 kg/m²   Physical Exam  Vitals reviewed.   Constitutional:       General: She is not in acute distress.     Appearance: Normal appearance. She is not toxic-appearing.   HENT:      Head: Normocephalic and atraumatic.   Eyes:      General: No scleral icterus.     Conjunctiva/sclera: Conjunctivae normal.   Musculoskeletal:         General: No deformity.      Right lower leg: Edema present.      Left lower leg: Edema present.   Skin:     General: Skin is warm and dry.   Neurological:      Mental Status: She is alert and oriented to person, place, and time.   Psychiatric:         Mood and Affect: Mood normal.         Behavior: Behavior normal.                 Assessment and Plan               Attention deficit hyperactivity disorder (ADHD), unspecified ADHD type    Medicare annual wellness visit, subsequent    High risk medication " use    Need for influenza vaccination    Hypertension, unspecified type    Hyperlipidemia, unspecified hyperlipidemia type     Diagnoses and all orders for this visit:    1. Medicare annual wellness visit, subsequent (Primary)    2. Attention deficit hyperactivity disorder (ADHD), unspecified ADHD type  -     Discontinue: amphetamine-dextroamphetamine (Adderall) 10 MG tablet; Take 1 tablet by mouth Daily.  Dispense: 30 tablet; Refill: 0    3. High risk medication use  -     Cancel: Compliance Drug Analysis, Ur - Urine, Clean Catch    4. Need for influenza vaccination  -     Fluzone High-Dose 65+yrs    5. Hypertension, unspecified type  -     Comprehensive Metabolic Panel; Future  -     Lipid Panel With / Chol / HDL Ratio; Future    6. Hyperlipidemia, unspecified hyperlipidemia type  -     Comprehensive Metabolic Panel; Future  -     Lipid Panel With / Chol / HDL Ratio; Future       We discussed mediatation as being better option for ADHD     I will plan to see her back in about 3mo to see how she is doing with focus and meditation and if no improvement we can consider resuming stimulant. Getting labs prior to next OV             Follow Up   Return in about 3 months (around 3/10/2025).  Patient was given instructions and counseling regarding her condition or for health maintenance advice. Please see specific information pulled into the AVS if appropriate.

## 2024-12-11 DIAGNOSIS — E78.5 HYPERLIPIDEMIA, UNSPECIFIED HYPERLIPIDEMIA TYPE: ICD-10-CM

## 2024-12-11 RX ORDER — ATORVASTATIN CALCIUM 40 MG/1
40 TABLET, FILM COATED ORAL DAILY
Qty: 90 TABLET | Refills: 2 | Status: SHIPPED | OUTPATIENT
Start: 2024-12-11

## 2024-12-19 ENCOUNTER — OFFICE VISIT (OUTPATIENT)
Dept: FAMILY MEDICINE CLINIC | Facility: CLINIC | Age: 74
End: 2024-12-19
Payer: MEDICARE

## 2024-12-19 VITALS
WEIGHT: 130 LBS | OXYGEN SATURATION: 97 % | SYSTOLIC BLOOD PRESSURE: 130 MMHG | DIASTOLIC BLOOD PRESSURE: 72 MMHG | HEART RATE: 73 BPM | HEIGHT: 58 IN | RESPIRATION RATE: 17 BRPM | BODY MASS INDEX: 27.29 KG/M2

## 2024-12-19 DIAGNOSIS — R73.03 BORDERLINE DIABETES: ICD-10-CM

## 2024-12-19 DIAGNOSIS — N95.8 OTHER SPECIFIED MENOPAUSAL AND PERIMENOPAUSAL DISORDERS: ICD-10-CM

## 2024-12-19 DIAGNOSIS — E78.2 MIXED HYPERLIPIDEMIA: ICD-10-CM

## 2024-12-19 DIAGNOSIS — M85.80 OSTEOPENIA, UNSPECIFIED LOCATION: ICD-10-CM

## 2024-12-19 DIAGNOSIS — Z00.00 GENERAL MEDICAL EXAM: Primary | ICD-10-CM

## 2024-12-19 PROCEDURE — 1126F AMNT PAIN NOTED NONE PRSNT: CPT | Performed by: FAMILY MEDICINE

## 2024-12-19 PROCEDURE — 2014F MENTAL STATUS ASSESS: CPT | Performed by: FAMILY MEDICINE

## 2024-12-19 PROCEDURE — 99397 PER PM REEVAL EST PAT 65+ YR: CPT | Performed by: FAMILY MEDICINE

## 2024-12-19 PROCEDURE — 3075F SYST BP GE 130 - 139MM HG: CPT | Performed by: FAMILY MEDICINE

## 2024-12-19 PROCEDURE — 3078F DIAST BP <80 MM HG: CPT | Performed by: FAMILY MEDICINE

## 2024-12-19 RX ORDER — DEXTROAMPHETAMINE SACCHARATE, AMPHETAMINE ASPARTATE, DEXTROAMPHETAMINE SULFATE AND AMPHETAMINE SULFATE 2.5; 2.5; 2.5; 2.5 MG/1; MG/1; MG/1; MG/1
10 TABLET ORAL DAILY
COMMUNITY
Start: 2024-12-10

## 2024-12-19 NOTE — PROGRESS NOTES
"Chief Complaint  Establish Care    Subjective        Abhinav Fields presents to [facility]  History of Present Illness  73 yo f presents to clinic to establish care. Recently was prescribed adderall, has not taken yet.    Overall, doing okay. Former smoker, reduced etoh intake.    Hx osteopenia.            Objective   Vital Signs:  /72   Pulse 73   Resp 17   Ht 147.3 cm (58\")   Wt 59 kg (130 lb)   SpO2 97%   BMI 27.17 kg/m²   Estimated body mass index is 27.17 kg/m² as calculated from the following:    Height as of this encounter: 147.3 cm (58\").    Weight as of this encounter: 59 kg (130 lb).            Physical Exam  Constitutional:       Appearance: Normal appearance.   HENT:      Head: Normocephalic and atraumatic.      Nose: Nose normal.      Mouth/Throat:      Mouth: Mucous membranes are moist.   Eyes:      General: Lids are normal.      Conjunctiva/sclera: Conjunctivae normal.   Cardiovascular:      Rate and Rhythm: Normal rate and regular rhythm.   Pulmonary:      Effort: Pulmonary effort is normal.      Breath sounds: Normal breath sounds.   Musculoskeletal:      Cervical back: Normal range of motion.   Skin:     General: Skin is warm and dry.   Neurological:      General: No focal deficit present.      Mental Status: She is alert and oriented to person, place, and time.      Gait: Gait is intact.   Psychiatric:         Mood and Affect: Mood normal.         Behavior: Behavior normal.         Thought Content: Thought content normal.        Result Review :                Assessment and Plan   Diagnoses and all orders for this visit:    1. General medical exam (Primary)  Assessment & Plan:  Due for DEXA. UTD on mammo and colon cancer screening.    Declines immunizations today.      Orders:  -     Comprehensive metabolic panel  -     CBC w AUTO Differential  -     Lipid Panel  -     TSH Rfx On Abnormal To Free T4    2. Osteopenia, unspecified location  -     DEXA Bone " Density Axial  -     Vitamin D,25-Hydroxy    3. Mixed hyperlipidemia  Assessment & Plan:  On lipitor, recheck lipids/cmp     Orders:  -     Lipid Panel    4. Borderline diabetes  -     Hemoglobin A1c    5. Other specified menopausal and perimenopausal disorders  -     DEXA Bone Density Axial             Follow Up   No follow-ups on file.  Patient was given instructions and counseling regarding her condition or for health maintenance advice. Please see specific information pulled into the AVS if appropriate.

## 2024-12-19 NOTE — PATIENT INSTRUCTIONS
Prior to travel - update Tetanus shot at [facility]    Can re-address weight concerns after interventional treatment for lipedema/lymphedema.

## 2024-12-20 ENCOUNTER — PATIENT ROUNDING (BHMG ONLY) (OUTPATIENT)
Dept: FAMILY MEDICINE CLINIC | Facility: CLINIC | Age: 74
End: 2024-12-20
Payer: MEDICARE

## 2024-12-20 NOTE — PROGRESS NOTES
Good morning,    My name is Emmie, I am a medical assistant here at Dr. Rivera's office. We hope your recent visit with us went smoothly.     If you have any questions or concerns, please feel free to reach out at [phone]. Please take the time to fill out the survey that will be sent to you to help us better your care. Thank you and have a great day!     SOREN Olea

## 2024-12-21 LAB
25(OH)D3+25(OH)D2 SERPL-MCNC: 26.4 NG/ML (ref 30–100)
ALBUMIN SERPL-MCNC: 4.3 G/DL (ref 3.8–4.8)
ALP SERPL-CCNC: 72 IU/L (ref 44–121)
ALT SERPL-CCNC: 19 IU/L (ref 0–32)
AST SERPL-CCNC: 20 IU/L (ref 0–40)
BASOPHILS # BLD AUTO: 0 X10E3/UL (ref 0–0.2)
BASOPHILS NFR BLD AUTO: 1 %
BILIRUB SERPL-MCNC: 0.3 MG/DL (ref 0–1.2)
BUN SERPL-MCNC: 16 MG/DL (ref 8–27)
BUN/CREAT SERPL: 18 (ref 12–28)
CALCIUM SERPL-MCNC: 9.8 MG/DL (ref 8.7–10.3)
CHLORIDE SERPL-SCNC: 102 MMOL/L (ref 96–106)
CHOLEST SERPL-MCNC: 192 MG/DL (ref 100–199)
CO2 SERPL-SCNC: 25 MMOL/L (ref 20–29)
CREAT SERPL-MCNC: 0.88 MG/DL (ref 0.57–1)
EGFRCR SERPLBLD CKD-EPI 2021: 69 ML/MIN/1.73
EOSINOPHIL # BLD AUTO: 0.1 X10E3/UL (ref 0–0.4)
EOSINOPHIL NFR BLD AUTO: 1 %
ERYTHROCYTE [DISTWIDTH] IN BLOOD BY AUTOMATED COUNT: 13.5 % (ref 11.7–15.4)
GLOBULIN SER CALC-MCNC: 2.2 G/DL (ref 1.5–4.5)
GLUCOSE SERPL-MCNC: 92 MG/DL (ref 70–99)
HBA1C MFR BLD: 6.1 % (ref 4.8–5.6)
HCT VFR BLD AUTO: 43.9 % (ref 34–46.6)
HDLC SERPL-MCNC: 62 MG/DL
HGB BLD-MCNC: 14 G/DL (ref 11.1–15.9)
IMM GRANULOCYTES # BLD AUTO: 0 X10E3/UL (ref 0–0.1)
IMM GRANULOCYTES NFR BLD AUTO: 0 %
LDLC SERPL CALC-MCNC: 116 MG/DL (ref 0–99)
LYMPHOCYTES # BLD AUTO: 1.6 X10E3/UL (ref 0.7–3.1)
LYMPHOCYTES NFR BLD AUTO: 26 %
MCH RBC QN AUTO: 27.7 PG (ref 26.6–33)
MCHC RBC AUTO-ENTMCNC: 31.9 G/DL (ref 31.5–35.7)
MCV RBC AUTO: 87 FL (ref 79–97)
MONOCYTES # BLD AUTO: 0.5 X10E3/UL (ref 0.1–0.9)
MONOCYTES NFR BLD AUTO: 8 %
NEUTROPHILS # BLD AUTO: 3.9 X10E3/UL (ref 1.4–7)
NEUTROPHILS NFR BLD AUTO: 64 %
PLATELET # BLD AUTO: 304 X10E3/UL (ref 150–450)
POTASSIUM SERPL-SCNC: 4.6 MMOL/L (ref 3.5–5.2)
PROT SERPL-MCNC: 6.5 G/DL (ref 6–8.5)
RBC # BLD AUTO: 5.06 X10E6/UL (ref 3.77–5.28)
SODIUM SERPL-SCNC: 140 MMOL/L (ref 134–144)
TRIGL SERPL-MCNC: 78 MG/DL (ref 0–149)
TSH SERPL DL<=0.005 MIU/L-ACNC: 1.31 UIU/ML (ref 0.45–4.5)
VLDLC SERPL CALC-MCNC: 14 MG/DL (ref 5–40)
WBC # BLD AUTO: 6.1 X10E3/UL (ref 3.4–10.8)

## 2024-12-26 ENCOUNTER — HOSPITAL ENCOUNTER (OUTPATIENT)
Dept: BONE DENSITY | Facility: HOSPITAL | Age: 74
Discharge: HOME | End: 2024-12-26
Admitting: FAMILY MEDICINE
Payer: MEDICARE

## 2024-12-26 PROCEDURE — 77080 DXA BONE DENSITY AXIAL: CPT

## 2025-01-02 RX ORDER — CHOLECALCIFEROL (VITAMIN D3) 25 MCG
1000 TABLET ORAL DAILY
Qty: 90 TABLET | Refills: 1 | Status: SHIPPED | OUTPATIENT
Start: 2025-01-02

## 2025-01-05 PROBLEM — N95.8 OTHER SPECIFIED MENOPAUSAL AND PERIMENOPAUSAL DISORDERS: Status: ACTIVE | Noted: 2025-01-05

## 2025-01-05 PROBLEM — Z00.00 GENERAL MEDICAL EXAM: Status: ACTIVE | Noted: 2024-08-23

## 2025-01-08 RX ORDER — FLUOXETINE 10 MG/1
10 CAPSULE ORAL DAILY
Qty: 60 CAPSULE | Refills: 0 | Status: SHIPPED | OUTPATIENT
Start: 2025-01-08

## 2025-03-06 RX ORDER — FLUOXETINE 10 MG/1
10 CAPSULE ORAL DAILY
Qty: 60 CAPSULE | Refills: 0 | Status: SHIPPED | OUTPATIENT
Start: 2025-03-06

## 2025-03-06 NOTE — TELEPHONE ENCOUNTER
Rx Refill Note  Requested Prescriptions     Pending Prescriptions Disp Refills    FLUoxetine (PROzac) 10 MG capsule [Pharmacy Med Name: FLUOXETINE HCL 10 MG CAPSULE] 60 capsule 0     Sig: TAKE 1 CAPSULE BY MOUTH DAILY      Last office visit with prescribing clinician: 12/19/2024   Last telemedicine visit with prescribing clinician: Visit date not found   Next office visit with prescribing clinician: Visit date not found       Lorna Rico  03/06/25, 09:21 EST

## 2025-03-20 DIAGNOSIS — R60.0 EDEMA OF EXTREMITIES: Primary | ICD-10-CM

## 2025-03-24 ENCOUNTER — PATIENT MESSAGE (OUTPATIENT)
Dept: FAMILY MEDICINE CLINIC | Facility: CLINIC | Age: 75
End: 2025-03-24
Payer: MEDICARE

## 2025-05-12 RX ORDER — FLUOXETINE 10 MG/1
10 CAPSULE ORAL DAILY
Qty: 60 CAPSULE | Refills: 0 | Status: SHIPPED | OUTPATIENT
Start: 2025-05-12

## 2025-07-18 RX ORDER — FLUOXETINE 10 MG/1
10 CAPSULE ORAL DAILY
Qty: 60 CAPSULE | Refills: 0 | Status: SHIPPED | OUTPATIENT
Start: 2025-07-18

## (undated) DEVICE — GOWN,SIRUS,NON REINFRCD,LARGE,SET IN SL: Brand: MEDLINE

## (undated) DEVICE — PK PROC MAJ 40

## (undated) DEVICE — PENCL SMOKE/EVAC MEGADYNE TELESCP 10FT

## (undated) DEVICE — SYR LL TP 10ML STRL

## (undated) DEVICE — APPL CHLORAPREP HI/LITE 26ML ORNG

## (undated) DEVICE — SUT ETHIB 0/0 MO6 I8IN CX45D

## (undated) DEVICE — SUT SILK 2/0 TIES 18IN A185H

## (undated) DEVICE — GLV SURG BIOGEL LTX PF 6

## (undated) DEVICE — ANTIBACTERIAL UNDYED BRAIDED (POLYGLACTIN 910), SYNTHETIC ABSORBABLE SUTURE: Brand: COATED VICRYL

## (undated) DEVICE — SUT ANTIBAC VICRYL/PLS ABS TIE COAT SZ3/0 12X18IN UD

## (undated) DEVICE — GLV SURG SENSICARE POLYISPRN W/ALOE PF LF 6.5 GRN STRL